# Patient Record
Sex: FEMALE | Race: WHITE | NOT HISPANIC OR LATINO | Employment: FULL TIME | URBAN - METROPOLITAN AREA
[De-identification: names, ages, dates, MRNs, and addresses within clinical notes are randomized per-mention and may not be internally consistent; named-entity substitution may affect disease eponyms.]

---

## 2019-01-09 PROBLEM — N92.6 IRREGULAR MENSES: Status: ACTIVE | Noted: 2019-01-09

## 2019-01-09 PROBLEM — R92.30 DENSE BREASTS: Status: ACTIVE | Noted: 2017-11-09

## 2021-08-09 PROBLEM — U07.1 COVID-19: Status: RESOLVED | Noted: 2021-01-06 | Resolved: 2021-08-09

## 2021-08-09 PROBLEM — Z83.719 FAMILY HISTORY OF COLONIC POLYPS: Status: ACTIVE | Noted: 2021-08-09

## 2022-07-12 PROBLEM — N95.1 PERIMENOPAUSAL VASOMOTOR SYMPTOMS: Status: ACTIVE | Noted: 2019-01-09

## 2022-08-19 PROBLEM — N95.1 MENOPAUSAL SYMPTOMS: Status: ACTIVE | Noted: 2022-08-19

## 2022-09-19 ENCOUNTER — HOSPITAL ENCOUNTER (OUTPATIENT)
Dept: MAMMOGRAPHY | Facility: MEDICAL CENTER | Age: 47
Discharge: HOME | End: 2022-09-19
Payer: COMMERCIAL

## 2022-09-19 VITALS — WEIGHT: 157.19 LBS | HEIGHT: 64 IN | BODY MASS INDEX: 26.84 KG/M2

## 2022-09-19 DIAGNOSIS — Z12.31 ENCOUNTER FOR SCREENING MAMMOGRAM FOR BREAST CANCER: ICD-10-CM

## 2022-09-19 PROCEDURE — 77067 SCR MAMMO BI INCL CAD: CPT

## 2022-09-19 PROCEDURE — 77063 BREAST TOMOSYNTHESIS BI: CPT

## 2023-08-22 ENCOUNTER — OFFICE VISIT (OUTPATIENT)
Dept: FAMILY MEDICINE CLINIC | Facility: CLINIC | Age: 48
End: 2023-08-22
Payer: COMMERCIAL

## 2023-08-22 ENCOUNTER — APPOINTMENT (OUTPATIENT)
Dept: LAB | Facility: CLINIC | Age: 48
End: 2023-08-22
Payer: COMMERCIAL

## 2023-08-22 VITALS
SYSTOLIC BLOOD PRESSURE: 122 MMHG | WEIGHT: 162.8 LBS | BODY MASS INDEX: 27.79 KG/M2 | HEIGHT: 64 IN | DIASTOLIC BLOOD PRESSURE: 78 MMHG

## 2023-08-22 DIAGNOSIS — Z13.220 SCREENING, LIPID: ICD-10-CM

## 2023-08-22 DIAGNOSIS — E66.3 OVERWEIGHT: ICD-10-CM

## 2023-08-22 DIAGNOSIS — Z00.00 ANNUAL PHYSICAL EXAM: Primary | ICD-10-CM

## 2023-08-22 DIAGNOSIS — Z13.1 SCREENING FOR DIABETES MELLITUS: ICD-10-CM

## 2023-08-22 DIAGNOSIS — Z12.31 ENCOUNTER FOR SCREENING MAMMOGRAM FOR BREAST CANCER: ICD-10-CM

## 2023-08-22 LAB
ALBUMIN SERPL BCP-MCNC: 3.7 G/DL (ref 3.5–5)
ALP SERPL-CCNC: 99 U/L (ref 46–116)
ALT SERPL W P-5'-P-CCNC: 36 U/L (ref 12–78)
ANION GAP SERPL CALCULATED.3IONS-SCNC: 3 MMOL/L
AST SERPL W P-5'-P-CCNC: 38 U/L (ref 5–45)
BILIRUB SERPL-MCNC: 0.47 MG/DL (ref 0.2–1)
BUN SERPL-MCNC: 16 MG/DL (ref 5–25)
CALCIUM SERPL-MCNC: 8.8 MG/DL (ref 8.3–10.1)
CHLORIDE SERPL-SCNC: 109 MMOL/L (ref 96–108)
CHOLEST SERPL-MCNC: 148 MG/DL
CO2 SERPL-SCNC: 27 MMOL/L (ref 21–32)
CREAT SERPL-MCNC: 0.75 MG/DL (ref 0.6–1.3)
EST. AVERAGE GLUCOSE BLD GHB EST-MCNC: 94 MG/DL
GFR SERPL CREATININE-BSD FRML MDRD: 95 ML/MIN/1.73SQ M
GLUCOSE P FAST SERPL-MCNC: 88 MG/DL (ref 65–99)
HBA1C MFR BLD: 4.9 %
HDLC SERPL-MCNC: 79 MG/DL
LDLC SERPL CALC-MCNC: 60 MG/DL (ref 0–100)
NONHDLC SERPL-MCNC: 69 MG/DL
POTASSIUM SERPL-SCNC: 3.9 MMOL/L (ref 3.5–5.3)
PROT SERPL-MCNC: 7 G/DL (ref 6.4–8.4)
SODIUM SERPL-SCNC: 139 MMOL/L (ref 135–147)
TRIGL SERPL-MCNC: 44 MG/DL

## 2023-08-22 PROCEDURE — 80053 COMPREHEN METABOLIC PANEL: CPT

## 2023-08-22 PROCEDURE — 36415 COLL VENOUS BLD VENIPUNCTURE: CPT

## 2023-08-22 PROCEDURE — 83036 HEMOGLOBIN GLYCOSYLATED A1C: CPT

## 2023-08-22 PROCEDURE — 80061 LIPID PANEL: CPT

## 2023-08-22 PROCEDURE — 99396 PREV VISIT EST AGE 40-64: CPT | Performed by: FAMILY MEDICINE

## 2023-08-22 RX ORDER — GRAPE SEED EXT/BIOFLAV,CITRUS 50MG-250MG
CAPSULE ORAL
COMMUNITY

## 2023-08-22 RX ORDER — ST. JOHN'S WORT 300 MG
CAPSULE ORAL
COMMUNITY
Start: 2023-08-21

## 2023-08-22 NOTE — ASSESSMENT & PLAN NOTE
Discussed diet and exercise with patient Patient has started walking again I did recommend flu and covid booster I have ordered labs Patient is up to date on screening tests Patient to see me in one year

## 2023-08-22 NOTE — PROGRESS NOTES
Chief Complaint   Patient presents with   • Annual Exam     Name: Kin Orozco      : 1975      MRN: [id number]  Encounter Provider: Abel Vasquez DO  Encounter Date: 2023   Encounter department: [facility] PRIMARY CARE    Assessment & Plan     1.  Encounter for screening mammogram for breast cancer           Subjective      HPI  Review of Systems    Current Outpatient Medications on File Prior to Visit   Medication Sig   • Black Cohosh 540 MG CAPS    • MULTIPLE VITAMINS PO Take by mouth daily   • Rhubarb (ESTROVEN COMPLETE PO)    • St Johns Wort 300 MG CAPS        Objective     /78   Ht 5' 4" (1.626 m)   Wt 73.8 kg (162 lb 12.8 oz)   BMI 27.94 kg/m²     Physical Exam  Abel Vasquez DO

## 2023-08-22 NOTE — PROGRESS NOTES
[address] [facility] PR[facility]    NAME: Megan Rebolledo  AGE: 53 y.o. SEX: female  : 1975     DATE: 2023     Assessment and Plan:     Problem List Items Addressed This Visit        Other    Annual physical exam - Primary     Discussed diet and exercise with patient Patient has started walking again I did recommend flu and covid booster I have ordered labs Patient is up to date on screening tests Patient to see me in one year         Overweight     Discussed diet and exercise         Other Visit Diagnoses     Encounter for screening mammogram for breast cancer        Relevant Orders    Mammo screening bilateral w 3d & cad    Screening for diabetes mellitus        Relevant Orders    Comprehensive metabolic panel    Hemoglobin A1C    Screening, lipid        Relevant Orders    Lipid panel          Immunizations and preventive care screenings were discussed with patient today. Appropriate education was printed on patient's after visit summary. Counseling:  Alcohol/drug use: discussed moderation in alcohol intake, the recommendations for healthy alcohol use, and avoidance of illicit drug use. Dental Health: discussed importance of regular tooth brushing, flossing, and dental visits. Injury prevention: discussed safety/seat belts, safety helmets, smoke detectors, carbon dioxide detectors, and smoking near bedding or upholstery. Sexual health: discussed sexually transmitted diseases, partner selection, use of condoms, avoidance of unintended pregnancy, and contraceptive alternatives. · Exercise: the importance of regular exercise/physical activity was discussed. Recommend exercise 3-5 times per week for at least 30 minutes. BMI Counseling: Body mass index is 27.94 kg/m². The BMI is above normal. Nutrition recommendations include decreasing portion sizes and moderation in carbohydrate intake.  Exercise recommendations include exercising 3-5 times per week. Rationale for BMI follow-up plan is due to patient being overweight or obese. Depression Screening and Follow-up Plan: Patient was screened for depression during today's encounter. They screened negative with a PHQ-2 score of 2. Return in 1 year (on 8/22/2024). Chief Complaint:     Chief Complaint   Patient presents with   • Annual Exam      History of Present Illness:     Adult Annual Physical   Patient here for a comprehensive physical exam. The patient reports problems - hot flashes and some concerns about weight . Diet and Physical Activity  · Diet/Nutrition: well balanced diet, limited junk food, low carb diet and consuming 3-5 servings of fruits/vegetables daily. · Exercise: walking, 5-7 times a week on average and 30-60 minutes on average. Depression Screening  PHQ-2/9 Depression Screening    Little interest or pleasure in doing things: 2 - more than half the days  Feeling down, depressed, or hopeless: 0 - not at all  PHQ-2 Score: 2  PHQ-2 Interpretation: Negative depression screen       General Health  · Sleep: 6.   · Hearing: normal - bilateral.  · Vision: most recent eye exam <1 year ago and wears glasses. · Dental: regular dental visits and brushes teeth twice daily. /GYN Health  · Patient is: perimenopausal  · Last menstrual period: november 2022  · Contraceptive method: post menopausal.     Review of Systems:     Review of Systems   Constitutional: Negative for fatigue, fever and unexpected weight change. HENT: Negative for congestion, sinus pain and trouble swallowing. Eyes: Negative for discharge and visual disturbance. Respiratory: Negative for cough, chest tightness, shortness of breath and wheezing. Cardiovascular: Negative for chest pain, palpitations and leg swelling. Gastrointestinal: Negative for abdominal pain, blood in stool, constipation, diarrhea, nausea and vomiting.    Genitourinary: Negative for difficulty urinating, dysuria, frequency and hematuria. Musculoskeletal: Negative for arthralgias, gait problem and joint swelling. Skin: Negative for rash and wound. Allergic/Immunologic: Negative for environmental allergies and food allergies. Neurological: Negative for dizziness, syncope, weakness, numbness and headaches. Hematological: Negative for adenopathy. Does not bruise/bleed easily. Psychiatric/Behavioral: Negative for confusion, decreased concentration and sleep disturbance. The patient is not nervous/anxious. Past Medical History:     History reviewed. No pertinent past medical history. Past Surgical History:     Past Surgical History:   Procedure Laterality Date   • CHEST WALL BIOPSY Right 10/27/2016    Procedure: ABDOMINAL MASS EXCISION ;  Surgeon: Savanna Zapata MD;  [facility];  Service:    • CHOLECYSTECTOMY      2011   • [place] BIOPSY SOFT TISSUE BACK/FLANK SUPERFICIAL Left 10/27/2016    Procedure: SHOULDER MASS EXCISION ;  Surgeon: Savanna Zapata MD;  [facility]: [facility];  Service: Plastics      Social History:     Social History     Socioeconomic History   • Marital status: /Civil Union     Spouse name: None   • Number of children: None   • Years of education: None   • Highest education level: None   Occupational History   • None   Tobacco Use   • Smoking status: Never   • Smokeless tobacco: Never   Vaping Use   • Vaping Use: Never used   Substance and Sexual Activity   • Alcohol use:  Yes     Alcohol/week: 1.0 standard drink of alcohol     Types: 1 Glasses of wine per week     Comment: occasionally 2 times per week   • Drug use: No   • Sexual activity: Yes     Partners: Male     Birth control/protection: Male Sterilization   Other Topics Concern   • None   Social History Narrative   • None     Social Determinants of Health     Financial Resource Strain: Not on file   Food Insecurity: Not on file   Transportation Needs: Not on file   Physical Activity: Not on file   Stress: Not on file   Social Connections: Not on file   Intimate Partner Violence: Not on file   Housing Stability: Not on file      Family History:     Family History   Problem Relation Age of Onset   • Hypertension Mother    • Colon polyps Mother    • Hypertension Father    • Diabetes Father    • Prostate cancer Father 62        2nd occurence 61   • No Known Problems Sister    • No Known Problems Son    • No Known Problems Son    • No Known Problems Maternal Grandmother    • Lymphoma Maternal Grandfather 80   • Cancer Paternal Grandmother    • Parkinsonism Paternal Grandmother    • Hypertension Paternal Grandmother    • No Known Problems Paternal Grandfather    • No Known Problems Maternal Uncle    • No Known Problems Paternal Aunt    • Endometrial cancer Other 80   • Breast cancer Other 80        age unknown   • Ovarian cancer Other 80   • Hypertension Other    • Kidney disease Other       Current Medications:     Current Outpatient Medications   Medication Sig Dispense Refill   • Black Cohosh 540 MG CAPS      • MULTIPLE VITAMINS PO Take by mouth daily     • Rhubarb (ESTROVEN COMPLETE PO)      • St Johns Wort 300 MG CAPS        No current facility-administered medications for this visit. Allergies:     No Known Allergies   Physical Exam:     /78   Ht 5' 4" (1.626 m)   Wt 73.8 kg (162 lb 12.8 oz)   BMI 27.94 kg/m²     Physical Exam  Vitals and nursing note reviewed. Constitutional:       Appearance: Normal appearance. She is well-developed. HENT:      Head: Normocephalic and atraumatic. Right Ear: Tympanic membrane and external ear normal.      Left Ear: Tympanic membrane and external ear normal.      Nose: Nose normal.      Mouth/Throat:      Pharynx: No oropharyngeal exudate. Eyes:      Extraocular Movements: Extraocular movements intact. Conjunctiva/sclera: Conjunctivae normal.      Pupils: Pupils are equal, round, and reactive to light. Neck:      Thyroid: No thyromegaly.       Trachea: No tracheal deviation. Cardiovascular:      Rate and Rhythm: Normal rate and regular rhythm. Heart sounds: Normal heart sounds. Pulmonary:      Effort: Pulmonary effort is normal. No respiratory distress. Breath sounds: Normal breath sounds. No wheezing or rales. Abdominal:      General: Bowel sounds are normal.      Palpations: Abdomen is soft. Musculoskeletal:         General: Normal range of motion. Cervical back: Normal range of motion and neck supple. Lymphadenopathy:      Cervical: No cervical adenopathy. Skin:     General: Skin is warm. Findings: No rash. Neurological:      General: No focal deficit present. Mental Status: She is alert and oriented to person, place, and time. Cranial Nerves: No cranial nerve deficit. Motor: No abnormal muscle tone. Psychiatric:         Mood and Affect: Mood normal.         Behavior: Behavior normal.         Thought Content:  Thought content normal.         Judgment: Judgment normal.          [facility]

## 2023-08-25 ENCOUNTER — ANNUAL EXAM (OUTPATIENT)
Dept: GYNECOLOGY | Facility: CLINIC | Age: 48
End: 2023-08-25
Payer: COMMERCIAL

## 2023-08-25 VITALS
SYSTOLIC BLOOD PRESSURE: 112 MMHG | DIASTOLIC BLOOD PRESSURE: 80 MMHG | WEIGHT: 162.6 LBS | HEIGHT: 64 IN | BODY MASS INDEX: 27.76 KG/M2

## 2023-08-25 DIAGNOSIS — N95.2 VAGINAL ATROPHY: ICD-10-CM

## 2023-08-25 DIAGNOSIS — Z12.31 ENCOUNTER FOR SCREENING MAMMOGRAM FOR BREAST CANCER: ICD-10-CM

## 2023-08-25 DIAGNOSIS — Z01.419 WOMEN'S ANNUAL ROUTINE GYNECOLOGICAL EXAMINATION: Primary | ICD-10-CM

## 2023-08-25 DIAGNOSIS — N95.1 MENOPAUSAL SYMPTOMS: ICD-10-CM

## 2023-08-25 PROCEDURE — S0612 ANNUAL GYNECOLOGICAL EXAMINA: HCPCS | Performed by: OBSTETRICS & GYNECOLOGY

## 2023-08-25 NOTE — PROGRESS NOTES
Assessment/Plan   Diagnoses and all orders for this visit:    Women's annual routine gynecological examination    Encounter for screening mammogram for breast cancer  -     Mammo screening bilateral w 3d & cad; Future    Vaginal atrophy    Menopausal symptoms    1. yearly exam-Pap smear deferred, self breast awareness reviewed, calcium/vitamin D recommendations discussed, mammogram request given, Cologuard was - 9/26/2022. Counseled about 92% sensitivity versus colonoscopy, would follow-up in 3 years time, September 2025  2. menopause symptoms-does note significant night sweats and some disruption with sleep. Practical recommendations were reviewed. She is trying black cohosh, Estroven with some response but still notes symptoms. Did discuss medical treatments including SSRI medication, hormones, and Veozah. She declines these at present. They were written down for her and she will consider researching them. She was given education sheets on menopause and she will review them. 3.  Prior increased breast density-normal density most recent mammograms. 4.  Perimenopausal-last menses November 2022.  5.  Vaginal atrophy-does note intermittent dryness in the vagina and some discomfort with sex occasionally. Vaginal furcation/moisturizer sheet given, to use accordingly. Discussed vaginal estrogen as well, she will consider. 6. previous history irregular bleeding-resolved  7. other- last year. My congratulations given. 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 My support given for this excellent program.  Follow-up 1 year for yearly exam or as needed. Subjective   Patient ID: Marion Patel is a 53 y.o. female. Vitals:    08/25/23 0654   BP: 112/80     Patient was seen today for yearly exam.  Please see assessment plan for details.       The following portions of the patient's history were reviewed and updated as appropriate: allergies, current medications, past family history, past medical history, past social history, past surgical history and problem list.  History reviewed. No pertinent past medical history. Past Surgical History:   Procedure Laterality Date   • CHEST WALL BIOPSY Right 10/27/2016    Procedure: ABDOMINAL MASS EXCISION ;  Surgeon: Savanna Zapata MD;  [facility];  Service:    • CHOLECYSTECTOMY         • [place] BIOPSY SOFT TISSUE BACK/FLANK SUPERFICIAL Left 10/27/2016    Procedure: SHOULDER MASS EXCISION ;  Surgeon: Savanna Zapata MD;  [facility];  Service: Plastics     OB History    Para Term  AB Living   2 2 2 0 0 2   SAB IAB Ectopic Multiple Live Births   0 0 0 0 0      # Outcome Date GA Lbr Sav/2nd Weight Sex Delivery Anes PTL Lv   2 Term            1 Term                Current Outpatient Medications:   •  Black Cohosh 540 MG CAPS, , Disp: , Rfl:   •  MULTIPLE VITAMINS PO, Take by mouth daily, Disp: , Rfl:   •  Rhubarb (ESTROVEN COMPLETE PO), , Disp: , Rfl:   •  St Johns Wort 300 MG CAPS, , Disp: , Rfl:   No Known Allergies  Social History     Socioeconomic History   • Marital status: /Civil Union     Spouse name: None   • Number of children: None   • Years of education: None   • Highest education level: None   Occupational History   • None   Tobacco Use   • Smoking status: Never   • Smokeless tobacco: Never   Vaping Use   • Vaping Use: Never used   Substance and Sexual Activity   • Alcohol use:  Yes     Alcohol/week: 1.0 standard drink of alcohol     Types: 1 Glasses of wine per week     Comment: occasionally 2 times per week   • Drug use: No   • Sexual activity: Yes     Partners: Male     Birth control/protection: Male Sterilization   Other Topics Concern   • None   Social History Narrative   • None     Social Determinants of Health     Financial Resource Strain: Not on file   Food Insecurity: Not on file   Transportation Needs: Not on file   Physical Activity: Not on file   Stress: Not on file   Social Connections: Not on file   Intimate Partner Violence: Not on file   Housing Stability: Not on file     Family History   Problem Relation Age of Onset   • Hypertension Mother    • Colon polyps Mother    • Hypertension Father    • Diabetes Father    • Prostate cancer Father 62        2nd occurence 61   • No Known Problems Sister    • No Known Problems Son    • No Known Problems Son    • No Known Problems Maternal Grandmother    • Lymphoma Maternal Grandfather 80   • Cancer Paternal Grandmother    • Parkinsonism Paternal Grandmother    • Hypertension Paternal Grandmother    • No Known Problems Paternal Grandfather    • No Known Problems Maternal Uncle    • No Known Problems Paternal Aunt    • Endometrial cancer Other 80   • Breast cancer Other 80        age unknown   • Ovarian cancer Other 80   • Hypertension Other    • Kidney disease Other        Review of Systems   Constitutional: Negative for chills, diaphoresis, fatigue and fever. Respiratory: Negative for apnea, cough, chest tightness, shortness of breath and wheezing. Cardiovascular: Negative for chest pain, palpitations and leg swelling. Gastrointestinal: Negative for abdominal distention, abdominal pain, anal bleeding, constipation, diarrhea, nausea, rectal pain and vomiting. Genitourinary: Negative for difficulty urinating, dyspareunia, dysuria, frequency, hematuria, menstrual problem, pelvic pain, urgency, vaginal bleeding, vaginal discharge and vaginal pain. Musculoskeletal: Negative for arthralgias, back pain and myalgias. Skin: Negative for color change and rash. Neurological: Negative for dizziness, syncope, light-headedness, numbness and headaches. Hematological: Negative for adenopathy. Does not bruise/bleed easily. Psychiatric/Behavioral: Negative for dysphoric mood and sleep disturbance. The patient is not nervous/anxious.         Objective   Physical Exam  OBGyn Exam     Objective      /80 (BP Location: Right arm, Patient Position: Sitting)   Ht 5' 4" (1.626 m)   Wt 73.8 kg (162 lb 9.6 oz)   BMI 27.91 kg/m²     General:   alert and oriented, in no acute distress   Neck: normal to inspection and palpation   Breast: normal appearance, no masses or tenderness   Heart:    Lungs:    Abdomen: soft, non-tender, without masses or organomegaly   Vulva: normal   Vagina: Without erythema or lesions or discharge. Mildly atrophic   Cervix: Without lesions or discharge or cervicitis. No Cervical motion tenderness.   Mildly atrophic   Uterus: top normal size, anteverted, non-tender   Adnexa: no mass, fullness, tenderness   Rectum: negative    Psych:  Normal mood and affect   Skin:  Without obvious lesions   Eyes: symmetric, with normal movements and reactivity   Musculoskeletal:  Normal muscle tone and movements appreciated

## 2023-12-04 ENCOUNTER — HOSPITAL ENCOUNTER (OUTPATIENT)
Dept: MAMMOGRAPHY | Facility: MEDICAL CENTER | Age: 48
Discharge: HOME | End: 2023-12-04
Payer: COMMERCIAL

## 2023-12-04 VITALS — WEIGHT: 162.7 LBS | BODY MASS INDEX: 27.78 KG/M2 | HEIGHT: 64 IN

## 2023-12-04 DIAGNOSIS — Z12.31 ENCOUNTER FOR SCREENING MAMMOGRAM FOR BREAST CANCER: ICD-10-CM

## 2023-12-04 PROCEDURE — 77067 SCR MAMMO BI INCL CAD: CPT

## 2023-12-04 PROCEDURE — 77063 BREAST TOMOSYNTHESIS BI: CPT

## 2024-08-01 ENCOUNTER — APPOINTMENT (OUTPATIENT)
Dept: LAB | Facility: CLINIC | Age: 49
End: 2024-08-01
Payer: COMMERCIAL

## 2024-08-01 DIAGNOSIS — Z00.8 HEALTH EXAMINATION IN POPULATION SURVEY: ICD-10-CM

## 2024-08-01 LAB
CHOLEST SERPL-MCNC: 141 MG/DL
EST. AVERAGE GLUCOSE BLD GHB EST-MCNC: 91 MG/DL
HBA1C MFR BLD: 4.8 %
HDLC SERPL-MCNC: 62 MG/DL
LDLC SERPL CALC-MCNC: 65 MG/DL (ref 0–100)
NONHDLC SERPL-MCNC: 79 MG/DL
TRIGL SERPL-MCNC: 72 MG/DL

## 2024-08-01 PROCEDURE — 83036 HEMOGLOBIN GLYCOSYLATED A1C: CPT

## 2024-08-01 PROCEDURE — 80061 LIPID PANEL: CPT

## 2024-08-01 PROCEDURE — 36415 COLL VENOUS BLD VENIPUNCTURE: CPT

## 2024-08-06 ENCOUNTER — OFFICE VISIT (OUTPATIENT)
Dept: FAMILY MEDICINE CLINIC | Facility: CLINIC | Age: 49
End: 2024-08-06
Payer: COMMERCIAL

## 2024-08-06 VITALS
BODY MASS INDEX: 28.48 KG/M2 | SYSTOLIC BLOOD PRESSURE: 118 MMHG | TEMPERATURE: 98.4 F | WEIGHT: 166.8 LBS | HEART RATE: 83 BPM | HEIGHT: 64 IN | DIASTOLIC BLOOD PRESSURE: 80 MMHG | OXYGEN SATURATION: 99 % | RESPIRATION RATE: 16 BRPM

## 2024-08-06 DIAGNOSIS — E66.3 OVERWEIGHT: ICD-10-CM

## 2024-08-06 DIAGNOSIS — Z00.00 ANNUAL PHYSICAL EXAM: Primary | ICD-10-CM

## 2024-08-06 DIAGNOSIS — N95.2 VAGINAL ATROPHY: ICD-10-CM

## 2024-08-06 DIAGNOSIS — N95.1 MENOPAUSAL SYMPTOMS: ICD-10-CM

## 2024-08-06 PROCEDURE — 99396 PREV VISIT EST AGE 40-64: CPT | Performed by: FAMILY MEDICINE

## 2024-08-06 NOTE — ASSESSMENT & PLAN NOTE
Discussed sunscreen and skin cancer prevention Advanced directives paperwork was given to patient Flu shot recommended Patient is up to date with GYN for pap and mammogram recommended mammogram yearly She is up to date with colon cancer screening discussed diet and exercise with patient   
Refer to menopausal services  
Sandi is stable discussed diet and exercise with her followup in one year   
WDL

## 2024-08-06 NOTE — PROGRESS NOTES
Adult Annual Physical  Name: Belen Lawton      : 1975      MRN: [id number]  Encounter Provider: Shirley Powell DO  Encounter Date: 2024   Encounter department: [facility] PRIMARY CARE    Assessment & Plan   1. Annual physical exam  Assessment & Plan:  Discussed sunscreen and skin cancer prevention Advanced directives paperwork was given to patient Flu shot recommended Patient is up to date with GYN for pap and mammogram recommended mammogram yearly She is up to date with colon cancer screening discussed diet and exercise with patient   2. Menopausal symptoms  Assessment & Plan:  Refer to menopausal services  Orders:  -     Ambulatory Referral to Obstetrics / Gynecology; Future  3. Vaginal atrophy  -     Ambulatory Referral to Obstetrics / Gynecology; Future  4. Overweight  Assessment & Plan:  Sandi is stable discussed diet and exercise with her followup in one year     Immunizations and preventive care screenings were discussed with patient today. Appropriate education was printed on patient's after visit summary.    Counseling:  Alcohol/drug use: discussed moderation in alcohol intake, the recommendations for healthy alcohol use, and avoidance of illicit drug use.  Dental Health: discussed importance of regular tooth brushing, flossing, and dental visits.  Injury prevention: discussed safety/seat belts, safety helmets, smoke detectors, carbon dioxide detectors, and smoking near bedding or upholstery.  Sexual health: discussed sexually transmitted diseases, partner selection, use of condoms, avoidance of unintended pregnancy, and contraceptive alternatives.  Exercise: the importance of regular exercise/physical activity was discussed. Recommend exercise 3-5 times per week for at least 30 minutes.          History of Present Illness     Adult Annual Physical:  Patient presents for annual physical. Patient is here annual PE She is watching her diet She is using premier shake breakfast  then has salad for lunch She does walk during the week Patient is not consistent with sunscreen use She is menopausal for almost 2 yrs She is struggling with sleep and hot flashes Patient is interested in GYN menopause department She has not done living will Work is going well Her lipids and A1c are normal She is up to date on her screenings and shots.     Diet and Physical Activity:  - Diet/Nutrition: well balanced diet.  - Exercise: 3-4 times a week on average, 30-60 minutes on average and walking.    Depression Screening:  - PHQ-2 Score: 1    General Health:  - Sleep: sleeps poorly. hot flashes keep waking her  - Hearing: normal hearing right ear and normal hearing left ear.  - Vision: wears glasses and contacts and most recent eye exam > 1 year ago.  - Dental: regular dental visits and brushes teeth twice daily.    /GYN Health:  - Follows with GYN: yes.   - Menopause: postmenopausal.   - Last menstrual cycle: 11/21/2022.   - Contraception: menopause.      Advanced Care Planning:  - Has an advanced directive?: no    - Has a durable medical POA?: no    - ACP document given to patient?: yes      Review of Systems   Constitutional:  Negative for fatigue, fever and unexpected weight change.   HENT:  Negative for congestion, sinus pain and trouble swallowing.    Eyes:  Negative for discharge and visual disturbance.   Respiratory:  Negative for cough, chest tightness, shortness of breath and wheezing.    Cardiovascular:  Negative for chest pain, palpitations and leg swelling.   Gastrointestinal:  Negative for abdominal pain, blood in stool, constipation, diarrhea, nausea and vomiting.   Genitourinary:  Negative for difficulty urinating, dysuria, frequency and hematuria.   Musculoskeletal:  Negative for arthralgias, gait problem and joint swelling.   Skin:  Negative for rash and wound.   Allergic/Immunologic: Negative for environmental allergies and food allergies.   Neurological:  Negative for dizziness, syncope,  "weakness, numbness and headaches.   Hematological:  Negative for adenopathy. Does not bruise/bleed easily.   Psychiatric/Behavioral:  Positive for sleep disturbance. Negative for confusion and decreased concentration. The patient is not nervous/anxious.          Objective     /80   Pulse 83   Temp 98.4 °F (36.9 °C) (Temporal)   Resp 16   Ht 5' 4\" (1.626 m)   Wt 75.7 kg (166 lb 12.8 oz)   LMP 11/19/2021   SpO2 99%   BMI 28.63 kg/m²     Physical Exam  Vitals and nursing note reviewed.   Constitutional:       Appearance: Normal appearance. She is well-developed.   HENT:      Head: Normocephalic and atraumatic.      Right Ear: Hearing, tympanic membrane and external ear normal.      Left Ear: Hearing, tympanic membrane and external ear normal.   Eyes:      Extraocular Movements: Extraocular movements intact.      Conjunctiva/sclera: Conjunctivae normal.      Pupils: Pupils are equal, round, and reactive to light.   Neck:      Thyroid: No thyromegaly.   Cardiovascular:      Rate and Rhythm: Normal rate and regular rhythm.      Heart sounds: Normal heart sounds.   Pulmonary:      Effort: Pulmonary effort is normal.      Breath sounds: Normal breath sounds. No wheezing or rales.   Abdominal:      General: Bowel sounds are normal. There is no distension.      Palpations: Abdomen is soft.      Tenderness: There is no abdominal tenderness.   Musculoskeletal:         General: No tenderness.      Cervical back: Neck supple.   Lymphadenopathy:      Cervical: No cervical adenopathy.   Skin:     General: Skin is warm and dry.      Findings: No rash.   Neurological:      General: No focal deficit present.      Mental Status: She is alert and oriented to person, place, and time.      Cranial Nerves: No cranial nerve deficit.      Coordination: Coordination normal.   Psychiatric:         Mood and Affect: Mood normal.         Behavior: Behavior normal.         Thought Content: Thought content normal.         Judgment: " Judgment normal.

## 2024-08-06 NOTE — PATIENT INSTRUCTIONS
"Patient Education     Routine physical for adults   The Basics   Written by the doctors and editors at [facility]   What is a physical? -- A physical is a routine visit, or \"check-up,\" with your doctor. You might also hear it called a \"wellness visit\" or \"preventive visit.\"  During each visit, the doctor will:   Ask about your physical and mental health   Ask about your habits, behaviors, and lifestyle   Do an exam   Give you vaccines if needed   Talk to you about any medicines you take   Give advice about your health   Answer your questions  Getting regular check-ups is an important part of taking care of your health. It can help your doctor find and treat any problems you have. But it's also important for preventing health problems.  A routine physical is different from a \"sick visit.\" A sick visit is when you see a doctor because of a health concern or problem. Since physicals are scheduled ahead of time, you can think about what you want to ask the doctor.  How often should I get a physical? -- It depends on your age and health. In general, for people age 21 years and older:   If you are younger than 50 years, you might be able to get a physical every 3 years.   If you are 50 years or older, your doctor might recommend a physical every year.  If you have an ongoing health condition, like diabetes or high blood pressure, your doctor will probably want to see you more often.  What happens during a physical? -- In general, each visit will include:   Physical exam - The doctor or nurse will check your height, weight, heart rate, and blood pressure. They will also look at your eyes and ears. They will ask about how you are feeling and whether you have any symptoms that bother you.   Medicines - It's a good idea to bring a list of all the medicines you take to each doctor visit. Your doctor will talk to you about your medicines and answer any questions. Tell them if you are having any side effects that bother you. You " "should also tell them if you are having trouble paying for any of your medicines.   Habits and behaviors - This includes:   Your diet   Your exercise habits   Whether you smoke, drink alcohol, or use drugs   Whether you are sexually active   Whether you feel safe at home  Your doctor will talk to you about things you can do to improve your health and lower your risk of health problems. They will also offer help and support. For example, if you want to quit smoking, they can give you advice and might prescribe medicines. If you want to improve your diet or get more physical activity, they can help you with this, too.   Lab tests, if needed - The tests you get will depend on your age and situation. For example, your doctor might want to check your:   Cholesterol   Blood sugar   Iron level   Vaccines - The recommended vaccines will depend on your age, health, and what vaccines you already had. Vaccines are very important because they can prevent certain serious or deadly infections.   Discussion of screening - \"Screening\" means checking for diseases or other health problems before they cause symptoms. Your doctor can recommend screening based on your age, risk, and preferences. This might include tests to check for:   Cancer, such as breast, prostate, cervical, ovarian, colorectal, prostate, lung, or skin cancer   Sexually transmitted infections, such as chlamydia and gonorrhea   Mental health conditions like depression and anxiety  Your doctor will talk to you about the different types of screening tests. They can help you decide which screenings to have. They can also explain what the results might mean.   Answering questions - The physical is a good time to ask the doctor or nurse questions about your health. If needed, they can refer you to other doctors or specialists, too.  Adults older than 65 years often need other care, too. As you get older, your doctor will talk to you about:   How to prevent falling at " home   Hearing or vision tests   Memory testing   How to take your medicines safely   Making sure that you have the help and support you need at home  All topics are updated as new evidence becomes available and our peer review process is complete.  This topic retrieved from [employer] on: May 02, 2024.  Topic 402963 Version 1.0  Release: 32.4.3 - C32.122  © 2024 UpToDate, Inc. and/or its affiliates. All rights reserved.  Consumer Information Use and Disclaimer   Disclaimer: This generalized information is a limited summary of diagnosis, treatment, and/or medication information. It is not meant to be comprehensive and should be used as a tool to help the user understand and/or assess potential diagnostic and treatment options. It does NOT include all information about conditions, treatments, medications, side effects, or risks that may apply to a specific patient. It is not intended to be medical advice or a substitute for the medical advice, diagnosis, or treatment of a health care provider based on the health care provider's examination and assessment of a patient's specific and unique circumstances. Patients must speak with a health care provider for complete information about their health, medical questions, and treatment options, including any risks or benefits regarding use of medications. This information does not endorse any treatments or medications as safe, effective, or approved for treating a specific patient. UpToDate, Inc. and its affiliates disclaim any warranty or liability relating to this information or the use thereof.The use of this information is governed by the Terms of Use, available at https://www.[employer].com/en/know/clinical-effectiveness-terms. 2024© UpToDate, Inc. and its affiliates and/or licensors. All rights reserved.  Copyright   © 2024 UpToDate, Inc. and/or its affiliates. All rights reserved.

## 2024-10-03 ENCOUNTER — OFFICE VISIT (OUTPATIENT)
Age: 49
End: 2024-10-03
Payer: COMMERCIAL

## 2024-10-03 VITALS
TEMPERATURE: 97.2 F | WEIGHT: 167.99 LBS | RESPIRATION RATE: 17 BRPM | OXYGEN SATURATION: 98 % | SYSTOLIC BLOOD PRESSURE: 114 MMHG | HEART RATE: 75 BPM | HEIGHT: 64 IN | BODY MASS INDEX: 28.68 KG/M2 | DIASTOLIC BLOOD PRESSURE: 70 MMHG

## 2024-10-03 DIAGNOSIS — M79.602 LEFT ARM PAIN: Primary | ICD-10-CM

## 2024-10-03 PROCEDURE — 99203 OFFICE O/P NEW LOW 30 MIN: CPT

## 2024-10-03 NOTE — PROGRESS NOTES
[facility]        NAME: Belen Lawton is a 49 y.o. female  : 1975    MRN: [id number]  DATE: October 3, 2024  TIME: 1:41 PM    Assessment and Plan   Left arm pain [M79.602]  1. Left arm pain        Recommended patient to follow up with PCP. Ibuprofen and ice in the meantime. Discussed that there may possibly be a circulation issue to the finger, and she should be further evaluated and worked up. Offered X-ray, patient declines as there was no trauma to the finger. Patient agreeable to plan. ED if symptoms worsen.   Patient Instructions     Follow up with PCP in 3-5 days.  Proceed to ER if symptoms worsen.    If tests have been performed at [facility], our office will contact you with results if changes need to be made to the care plan discussed with you at the visit.  You can review your full results on [facility] MyChart.    Chief Complaint     Chief Complaint   Patient presents with    Hand Pain     Pain and numbness in LEFT hand and forearm. Noticed bruising on LEFT hand when waking up this morning. States that pain has worsened throughout the day. Now radiating into elbow and LEFT shoulder. Denies injury to that area or changes in activity. OTC - none     History of Present Illness     Patient is a 49-year-old female presenting complaining of left index finger pain that started this morning. She notes there was bruising over the DIP that has now resolved, and the pain radiates up her lateral arm and back. She states she has had no injury to the finger, and the discoloration has dissipated now. She states this happened one other time before, but it resolved and did not cause any nerve pain. She was unsure what could be causing this.     Hand Pain         Review of Systems   Review of Systems   Constitutional:  Negative for chills and fever.   HENT: Negative.     Respiratory: Negative.     Gastrointestinal: Negative.    Genitourinary: Negative.    Musculoskeletal:  Positive for arthralgias and  myalgias. Negative for joint swelling.   Skin:  Positive for color change.   Neurological: Negative.          Current Medications       Current Outpatient Medications:     MULTIPLE VITAMINS PO, Take by mouth daily, Disp: , Rfl:     Rhubarb (ESTROVEN COMPLETE PO), , Disp: , Rfl:     St Johns Wort 300 MG CAPS, , Disp: , Rfl:     Black Cohosh 540 MG CAPS, , Disp: , Rfl:     Current Allergies     Allergies as of 10/03/2024    (No Known Allergies)            The following portions of the patient's history were reviewed and updated as appropriate: allergies, current medications, past family history, past medical history, past social history, past surgical history and problem list.     History reviewed. No pertinent past medical history.    Past Surgical History:   Procedure Laterality Date    CHEST WALL BIOPSY Right 10/27/2016    Procedure: ABDOMINAL MASS EXCISION ;  Surgeon: Darlin Reyna MD;  Location: [facility];  Service:     CHOLECYSTECTOMY      2011    ID BIOPSY SOFT TISSUE BACK/FLANK SUPERFICIAL Left 10/27/2016    Procedure: SHOULDER MASS EXCISION ;  Surgeon: Darlin Reyna MD;  Location: [facility];  Service: Plastics       Family History   Problem Relation Age of Onset    Hypertension Mother     Colon polyps Mother     Hypertension Father     Diabetes Father     Prostate cancer Father 58        2nd occurence 63    Leukemia Father     No Known Problems Sister     No Known Problems Son     Crohn's disease Son     No Known Problems Maternal Grandmother     Prostate cancer Maternal Grandfather     Lymphoma Maternal Grandfather 80    Cancer Paternal Grandmother     Parkinsonism Paternal Grandmother     Hypertension Paternal Grandmother     No Known Problems Paternal Grandfather     Ovarian cancer Maternal Aunt     Breast cancer Maternal Aunt     Breast cancer Maternal Aunt     No Known Problems Maternal Uncle     No Known Problems Paternal Aunt     Endometrial cancer Other 80    Breast cancer Other 80        age unknown     "Ovarian cancer Other 80    Hypertension Other     Kidney disease Other          Medications have been verified.        Objective   /70 (BP Location: Right arm, Patient Position: Sitting, Cuff Size: Standard)   Pulse 75   Temp (!) 97.2 °F (36.2 °C) (Tympanic)   Resp 17   Ht 5' 4\" (1.626 m)   Wt 76.2 kg (167 lb 15.9 oz)   LMP 11/19/2021   SpO2 98%   BMI 28.84 kg/m²   Patient's last menstrual period was 11/19/2021.       Physical Exam     Physical Exam  Vitals and nursing note reviewed.   Constitutional:       General: She is not in acute distress.     Appearance: Normal appearance. She is normal weight. She is not ill-appearing, toxic-appearing or diaphoretic.   HENT:      Head: Normocephalic and atraumatic.      Right Ear: External ear normal.      Left Ear: External ear normal.      Nose: Nose normal.      Mouth/Throat:      Mouth: Mucous membranes are moist.   Eyes:      Conjunctiva/sclera: Conjunctivae normal.   Cardiovascular:      Rate and Rhythm: Normal rate.      Pulses: Normal pulses.   Pulmonary:      Effort: Pulmonary effort is normal.      Breath sounds: Normal breath sounds.   Musculoskeletal:         General: Tenderness (over left index DIP, no tenderness to palpation of back, shoulder, or forearm) present. No swelling, deformity or signs of injury. Normal range of motion.      Cervical back: Normal range of motion.   Skin:     General: Skin is warm and dry.      Capillary Refill: Capillary refill takes less than 2 seconds.   Neurological:      General: No focal deficit present.      Mental Status: She is alert. Mental status is at baseline.   Psychiatric:         Mood and Affect: Mood normal.         Behavior: Behavior normal.                   "

## 2024-12-11 ENCOUNTER — OFFICE VISIT (OUTPATIENT)
Dept: URGENT CARE | Facility: MEDICAL CENTER | Age: 49
End: 2024-12-11
Payer: COMMERCIAL

## 2024-12-11 VITALS
TEMPERATURE: 99.1 F | HEIGHT: 64 IN | WEIGHT: 174.2 LBS | SYSTOLIC BLOOD PRESSURE: 145 MMHG | RESPIRATION RATE: 18 BRPM | HEART RATE: 77 BPM | BODY MASS INDEX: 29.74 KG/M2 | DIASTOLIC BLOOD PRESSURE: 74 MMHG | OXYGEN SATURATION: 99 %

## 2024-12-11 DIAGNOSIS — M77.12 LATERAL EPICONDYLITIS OF LEFT ELBOW: Primary | ICD-10-CM

## 2024-12-11 PROCEDURE — 99213 OFFICE O/P EST LOW 20 MIN: CPT | Performed by: NURSE PRACTITIONER

## 2024-12-11 RX ORDER — METHYLPREDNISOLONE 4 MG/1
TABLET ORAL
Qty: 1 EACH | Refills: 0 | Status: SHIPPED | OUTPATIENT
Start: 2024-12-11

## 2024-12-11 NOTE — PROGRESS NOTES
[facility]        NAME: Belen Lawton is a 49 y.o. female  : 1975    MRN: [id number]  DATE: 2024  TIME: 3:41 PM    Assessment and Plan   Lateral epicondylitis of left elbow [M77.12]  1. Lateral epicondylitis of left elbow  methylPREDNISolone 4 MG tablet therapy pack    Ambulatory Referral to Orthopedic Surgery        Will start course of medrol dose pack   Ref to ortho   Recommend continue lidoderm patch   No aleve or advil while taking steroids   Pt in agreement with plan of care    Patient Instructions     Follow up with PCP in 3-5 days.  Proceed to  ER if symptoms worsen.    Chief Complaint     Chief Complaint   Patient presents with    Arm Pain     Left arm pain started about a week ago. Felt elbow pop when trying to close an umbrella. The pain now feels like it is shooting down from elbow to fingers. Pt states that it feels like there is something in her elbow.         History of Present Illness   Belen Lawton presents to the clinic c/o    Arm Pain (Left arm pain started about a week ago. Felt elbow pop when trying to close an umbrella. The pain now feels like it is shooting down from elbow to fingers. Pt states that it feels like there is something in her elbow.)    Pt states for the past week her left elbow was bothering her - taking advil and using lidoderm patches which seemed to help.  Today was trying to put down an umbrella that wasn't working well and felt a pop in her left elbow and a pain sensation into her left hand.           Review of Systems   Review of Systems   All other systems reviewed and are negative.        Current Medications     No long-term medications on file.       Current Allergies     Allergies as of 2024    (No Known Allergies)            The following portions of the patient's history were reviewed and updated as appropriate: allergies, current medications, past family history, past medical history, past social history, past surgical history and  "problem list.    Objective   /74   Pulse 77   Temp 99.1 °F (37.3 °C) (Tympanic)   Resp 18   Ht 5' 4\" (1.626 m)   Wt 79 kg (174 lb 3.2 oz)   LMP 11/19/2021   SpO2 99%   BMI 29.90 kg/m²        Physical Exam     Physical Exam  Vitals and nursing note reviewed.   Constitutional:       Appearance: Normal appearance. She is well-developed.   HENT:      Head: Normocephalic and atraumatic.      Right Ear: Hearing normal.      Left Ear: Hearing normal.      Nose: Nose normal.      Mouth/Throat:      Lips: Pink.      Mouth: Mucous membranes are moist.      Pharynx: Oropharynx is clear.   Eyes:      General: Lids are normal.      Conjunctiva/sclera: Conjunctivae normal.      Pupils: Pupils are equal, round, and reactive to light.   Cardiovascular:      Rate and Rhythm: Normal rate and regular rhythm.      Heart sounds: Normal heart sounds, S1 normal and S2 normal.   Pulmonary:      Effort: Pulmonary effort is normal.      Breath sounds: Normal breath sounds.   Abdominal:      General: Abdomen is flat. Bowel sounds are normal.      Palpations: Abdomen is soft.   Musculoskeletal:         General: Normal range of motion.      Left elbow: No swelling, deformity, effusion or lacerations. Normal range of motion. Tenderness present in lateral epicondyle. No radial head, medial epicondyle or olecranon process tenderness.      Cervical back: Full passive range of motion without pain, normal range of motion and neck supple.   Skin:     General: Skin is warm and dry.   Neurological:      General: No focal deficit present.      Mental Status: She is alert and oriented to person, place, and time.   Psychiatric:         Mood and Affect: Mood normal.         Speech: Speech normal.         Behavior: Behavior normal. Behavior is cooperative.         Thought Content: Thought content normal.         Judgment: Judgment normal.                     "

## 2024-12-11 NOTE — PATIENT INSTRUCTIONS
Patient Education     Lateral Epicondylitis Exercises   About this topic   The elbow is where your upper arm bone meets the two lower bones in your arm. There is a bump on the outside of your elbow at the bottom of your upper arm bone. It is the lateral epicondyle. A few tendons attach here. Tendons attach muscles to bone. These muscles are used to pull your wrist and fingers up.   When these tendons get sore and swollen from overuse, you have lateral epicondylitis. Is also called tennis elbow. This is a common problem in tennis players. It may also happen with other activities or sports. You are more likely to have this problem in the arm you use most, but it can happen in either arm. Exercises can help to make this problem better.  General   Before starting with a program, ask your doctor if you are healthy enough to do these exercises. Your doctor may have you work with a  or physical therapist to make a safe exercise program to meet your needs.  Stretching Exercises   Stretching exercises keep your muscles flexible. They also stop them from getting tight. Start by doing each of these stretches 2 to 3 times. In order for your body to make changes, you will need to hold these stretches for 20 to 30 seconds. Try to do the stretches 2 to 3 times each day. Do all exercises slowly.  Wrist stretches bending down ? Straighten your elbow and have your palm facing down. Keeping your sore elbow straight, bend your hand and fingers down so that your fingers are now pointing to the floor. Grab your hand with your other hand and push back the wrist further until you feel a stretch.  Strengthening Exercises   Strengthening exercises keep your muscles firm and strong. Start by repeating each exercise 2 to 3 times. Work up to doing each exercise 10 times. Try to do the exercises 2 to 3 times each day. Do all exercises slowly.  Some exercises can be done holding a small weight. You can use a can of soup or a hand weight.  If the exercise gets too easy, use heavier weights or do the exercise more times.  Wrist exercises seated with your lower arm supported on a table or your leg. Your hand should be off the edge:  Bending up ? Have your palm facing UP with a small weight in your hand. Bend your wrist up as far as you can. Now, lower the weight back down. Be sure to control the weight as you lower it. Repeat using other hand.  Bending down ? Have your palm facing DOWN with a small weight in your hand. Bend your wrist back as far as you can. Now, lower the weight back down. Be sure to control the weight as you lower it. Repeat using other hand.  Side-to-side ? Position your hand SIDEWAYS with your thumb up. With a weight in your hand, lift your hand straight up. Now, lower your hand back down. Repeat using other hand.  Lower arm twists with weight ? Start by having your elbow bent with your arm at your side. Hold a small weight in your hand. Keeping your arm at your side and not moving your elbow, turn the lower arm until your palm is facing down. Now, turn the lower arm until your palm is facing up. Repeat using other hand.  Finger spreads with rubber band ? Find a thick rubber band. Straighten your fingers and bring them together so they are touching each other. Place the rubber band around your fingers and thumb as close to the nails as possible. Spread your fingers out as far as you can. Then, slowly bring them back to the starting position.  Ball squeezes ? Gently squeeze a tennis ball 10 times. If you have no pain, squeeze with more pressure.  Tennis strokes with exercise band ? Once your pain has lessened and you are almost ready to return to the tennis court, try these 3 exercises. You will need to exercise near a door or closet that can be opened and closed easily. Start with a thinner band and work your way up to a thicker band. These band exercises can help you with three tennis strokes:  Fuentes ? Secure an exercise band in  a door at waist level. Stand sideways with the hand you use the closest to the door. Grab the band with that hand. Pull the band across your body like you do in a denver motion.  Backhand ? Secure an exercise band in a door at waist level. Stand sideways with the hand you use the most away from the door. Reach toward the door and grab the band with this hand. Now, pull the band across your body like you do in a backhand motion.  Serve ? Secure an exercise band in a door at shoulder level. Stand facing away from the door. Grab the band with the hand you use the most. Start with your hand up and behind your head like you would for the start of a serve. Pull the band up and over like you are doing a serving motion.  Your doctor or therapist may also have you use a rubber bar or Flex bar for exercises to help your lateral epicondylitis.               What will the results be?   Less pain and swelling  Increased strength  Better range of motion  Greater ease doing arm activities  Helpful tips   Stay active and work out to keep your muscles strong and flexible.  Keep a healthy weight to avoid putting too much stress on your joints. Eat a healthy diet to keep your muscles healthy.  Be sure you do not hold your breath when exercising. This can raise your blood pressure. If you tend to hold your breath, try counting out loud when exercising. If any exercise bothers you, stop right away.  Always warm up before stretching. Heated muscles stretch much easier than cool muscles. Stretching cool muscles can lead to injury.  Try walking and swinging your arms at an easy pace for a few minutes to warm up your muscles. Do this again after exercising.  Never bounce when doing stretches.  Doing exercises before a meal may be a good way to get into a routine.  If you are using weights, choose a weight that will allow you to repeat the exercise 10 times before resting. If you easily do 10 repeats, you may not be using enough weight. If  you are not able to do 10 repeats, you are using too heavy of a weight.  Exercise may be slightly uncomfortable, but you should not have sharp pains. If you do get sharp pains, stop what you are doing. If the sharp pains continue, call your doctor.  Last Reviewed Date   2021-08-03  Consumer Information Use and Disclaimer   This generalized information is a limited summary of diagnosis, treatment, and/or medication information. It is not meant to be comprehensive and should be used as a tool to help the user understand and/or assess potential diagnostic and treatment options. It does NOT include all information about conditions, treatments, medications, side effects, or risks that may apply to a specific patient. It is not intended to be medical advice or a substitute for the medical advice, diagnosis, or treatment of a health care provider based on the health care provider's examination and assessment of a patient’s specific and unique circumstances. Patients must speak with a health care provider for complete information about their health, medical questions, and treatment options, including any risks or benefits regarding use of medications. This information does not endorse any treatments or medications as safe, effective, or approved for treating a specific patient. [employer] and its affiliates disclaim any warranty or liability relating to this information or the use thereof. The use of this information is governed by the Terms of Use, available at https://www.tastytrade.com/en/know/clinical-effectiveness-terms   Copyright   Copyright © 2024 [employer] and its affiliates and/or licensors. All rights reserved.  Patient Education     Lateral Epicondylitis Discharge Instructions   About this topic   The elbow is where the bone in your upper arm meets the two bones in your lower arm. There is a bump on the outside of your elbow at the bottom of your upper arm bone. It is the lateral epicondyle. A few  tendons attach here. Tendons attach muscles to bone. These muscles are used to pull your wrist and fingers up.   When these tendons get sore and swollen from overuse, you have lateral epicondylitis. It is also called tennis elbow. This is a common problem in tennis players. It may also happen with other activities or sports. You are more likely to have this problem in the arm you use most. It can happen in either arm. Most people get better with treatment and do not need surgery.     What care is needed at home?   Ask your doctor what you need to do when you go home. Make sure you ask questions if you do not understand what the doctor says. This way you will know what you need to do.  Rest. Allow your injury to heal before you do slow movements.  Place an ice pack or a bag of frozen peas wrapped in a towel over the painful part. Never put ice right on the skin. Do not leave the ice on more than 10 to 15 minutes at a time. Ice after activity may help decrease pain and swelling. Never ice before stretching.  Prop your elbow on pillows to help with swelling.  Use a brace or strap around the elbow to lessen the stress on the tendon.  Heat may be used later but not right away. Heat can make swelling worse. If your doctor tells you to use heat, put a heating pad on the painful part for no more than 20 minutes at a time. Never go to sleep with a heating pad on as this can cause burns.  What follow-up care is needed?   Your doctor may ask you to make visits to the office to check on your progress. Be sure to keep these visits.   Your doctor may send you to a specialist called an orthopedic surgeon.  Your doctor may send you to physical therapy or occupational therapy for treatments and exercises to help you heal faster.  What drugs may be needed?   The doctor may order drugs to:  Help with pain and swelling  The doctor may give you a shot of an anti-inflammatory drug called a corticosteroid. This will help with swelling.  Talk with your doctor about the risks of this shot.  Will physical activity be limited?   You may need to rest your elbow for a while. You should not do physical activity that makes your health problem worse. If you work out or play sports, you may not be able to do those things until your health problem gets better.  What problems could happen?   Long-term elbow pain  Injury returns  Weak and tight muscles  What can be done to prevent this health problem?   Stay active and work out to keep your muscles strong and flexible.  Warm up slowly and stretch before you exercise. Use good training techniques and form for sports. Have an expert look at your technique.  When working out with weights, try to keep your elbow slightly bent instead of straightened all the way. Avoid lifting objects when your elbow is fully straightened or your palm is down.  Take breaks often when doing things that use repeat movements.  Take extra care when playing racquet sports. Ask an expert for help when choosing equipment.  Use a two-handed backhand instead of a one-handed backhand. This puts less stress on your arm.  Racquets with smaller head sizes, a stiffer frame, and looser strings put less stress on your arm.  Keep a healthy weight so there is not extra stress on your joints. Eat a healthy diet to keep your muscles healthy.  When do I need to call the doctor?   You have numbness or tingling in your hands or fingers  You are not feeling better in 2 or 3 days or you are feeling worse  Teach Back: Helping You Understand   The Teach Back Method helps you understand the information we are giving you. After you talk with the staff, tell them in your own words what you learned. This helps to make sure the staff has described each thing clearly. It also helps to explain things that may have been confusing. Before going home, make sure you can do these:  I can tell you about my condition.  I can tell you what may help ease my pain.  I can tell  you what I will do if I have numbness in my hands or fingers.  Last Reviewed Date   2020-08-26  Consumer Information Use and Disclaimer   This generalized information is a limited summary of diagnosis, treatment, and/or medication information. It is not meant to be comprehensive and should be used as a tool to help the user understand and/or assess potential diagnostic and treatment options. It does NOT include all information about conditions, treatments, medications, side effects, or risks that may apply to a specific patient. It is not intended to be medical advice or a substitute for the medical advice, diagnosis, or treatment of a health care provider based on the health care provider's examination and assessment of a patient’s specific and unique circumstances. Patients must speak with a health care provider for complete information about their health, medical questions, and treatment options, including any risks or benefits regarding use of medications. This information does not endorse any treatments or medications as safe, effective, or approved for treating a specific patient. Up[employer], [employer] and its affiliates disclaim any warranty or liability relating to this information or the use thereof. The use of this information is governed by the Terms of Use, available at https://www.wolters360Tuwer.com/en/know/clinical-effectiveness-terms   Copyright   Copyright © 2024 UpTo[employer], Inc. and its affiliates and/or licensors. All rights reserved.

## 2025-01-20 ENCOUNTER — OFFICE VISIT (OUTPATIENT)
Dept: OBGYN CLINIC | Facility: MEDICAL CENTER | Age: 50
End: 2025-01-20
Payer: COMMERCIAL

## 2025-01-20 ENCOUNTER — APPOINTMENT (OUTPATIENT)
Dept: RADIOLOGY | Facility: MEDICAL CENTER | Age: 50
End: 2025-01-20
Payer: COMMERCIAL

## 2025-01-20 VITALS — BODY MASS INDEX: 29.71 KG/M2 | HEIGHT: 64 IN | WEIGHT: 174 LBS

## 2025-01-20 DIAGNOSIS — M25.522 PAIN IN LEFT ELBOW: ICD-10-CM

## 2025-01-20 DIAGNOSIS — M25.522 PAIN IN LEFT ELBOW: Primary | ICD-10-CM

## 2025-01-20 DIAGNOSIS — M77.12 LATERAL EPICONDYLITIS OF LEFT ELBOW: ICD-10-CM

## 2025-01-20 PROCEDURE — 99203 OFFICE O/P NEW LOW 30 MIN: CPT | Performed by: ORTHOPAEDIC SURGERY

## 2025-01-20 PROCEDURE — 73080 X-RAY EXAM OF ELBOW: CPT

## 2025-01-20 NOTE — PROGRESS NOTES
Orthopaedic Surgery - Office Note  Belen Lawton (49 y.o. female)   : 1975   MRN: [id number]  Encounter Date: 2025    Assessment / Plan  Lateral epicondylitis  of the left elbow    Activity as tolerated  Anti-inflammatories or Tylenol prn pain  X-rays ordered and reviewed at today's visit  Discussed beginning stretching and strengthening exercises for lateral epicondylitis. Directions of which exercises to perform provided.   Referral for outpatient physical therapy provided  Recommended she begin wearing a wrist brace in the evenings and while sleeping  Discussed possible radial tunnel syndrome if pain and weakness persists after failing physical therapy, rest, and bracing. Allow 6-8 weeks to see relief in symptoms   Follow-up:  Return if symptoms worsen or fail to improve.      Chief Complaint / Date of Onset  Left elbow pain and burning   Injury Mechanism / Date  Felt a pop while closing an umbrella in December  Surgery / Date  None    History of Present Illness   Belen Lawton is a 49 y.o. female who presents for initial consultation of left elbow pain, referred to me by urgent care. She notes trying to close an umbrella in early December when she felt a pop in her left elbow. Since the pop, she notes increased pain in the left elbow with lifting weighted items and while sleeping at night. The pain is currently sharp in nature and she experiences burning with activity that radiates to the forearm and wrist. Pain is managed with rest, Advil, wearing a counter force strap, and stretching. She denies distal numbness and tingling.     Treatment Summary  Medications / Modalities  Lidoderm patches, Advil  Bracing / Immobilization  Counter force strap  Physical Therapy  None  Injections  None  Prior Surgeries  None  Other Treatments  None    Employment / Current Status  [employer] nurse in hospice care    Sport / Organization / Current Status  Active       Review of Systems  Pertinent items are noted in HPI.  All  "other systems were reviewed and are negative.      Physical Exam  Ht 5' 4\" (1.626 m)   Wt 78.9 kg (174 lb)   LMP 11/19/2021   BMI 29.87 kg/m²   Cons: Appears well.  No apparent distress.  Psych: Alert. Oriented x3.  Mood and affect normal.  Eyes: PERRLA, EOMI  Resp: Normal effort.  No audible wheezing or stridor.  CV: Palpable pulse.  No discernable arrhythmia.    Lymph:  No palpable cervical, axillary, or inguinal lymphadenopathy.  Skin: Warm.  No palpable masses.  No visible lesions.  Neuro: Normal muscle tone.  Normal and symmetric DTR's.     Left Elbow Exam  Alignment:  Normal elbow alignment and carrying angle. Normal resting hand posture.  Inspection:  No swelling. No ecchymosis. No deformity.  Palpation:   Moderate tenderness at lateral extensor wad.  ROM:  Elbow Extension 0. Elbow Flexion 140.  Strength:  Wrist Extensors 4+/5. Wrist Flexors 5/5. Pronation 5/5. Supination 5/5.  Stability:  No objective elbow instability.  Stable varus and valgus stress.  Tests:  No pertinent positive or negative tests.  Neurovascular:  Sensation intact in Ax/R/M/U nerve distributions. 2+ radial pulse.        Studies Reviewed  XR of left elbow - Images from 1/20/25 demonstrate no acute fractures or dislocation. No lytic or blastic osseous lesions. No signs of degenerative changes.      Procedures  No procedures today.    Medical, Surgical, Family, and Social History  The patient's medical history, family history, and social history, were reviewed and updated as appropriate.    History reviewed. No pertinent past medical history.    Past Surgical History:   Procedure Laterality Date    CHEST WALL BIOPSY Right 10/27/2016    Procedure: ABDOMINAL MASS EXCISION ;  Surgeon: Darlin Reyna MD;  Location: [facility];  Service:     CHOLECYSTECTOMY      2011    [place] BIOPSY SOFT TISSUE BACK/FLANK SUPERFICIAL Left 10/27/2016    Procedure: SHOULDER MASS EXCISION ;  Surgeon: Darlin Reyna MD;  Location: [facility];  Service: Plastics       Family " History   Problem Relation Age of Onset    Hypertension Mother     Colon polyps Mother     Hypertension Father     Diabetes Father     Prostate cancer Father 58        2nd occurence 63    Leukemia Father     No Known Problems Sister     No Known Problems Son     Crohn's disease Son     No Known Problems Maternal Grandmother     Prostate cancer Maternal Grandfather     Lymphoma Maternal Grandfather 80    Cancer Paternal Grandmother     Parkinsonism Paternal Grandmother     Hypertension Paternal Grandmother     No Known Problems Paternal Grandfather     Ovarian cancer Maternal Aunt     Breast cancer Maternal Aunt     Breast cancer Maternal Aunt     No Known Problems Maternal Uncle     No Known Problems Paternal Aunt     Endometrial cancer Other 80    Breast cancer Other 80        age unknown    Ovarian cancer Other 80    Hypertension Other     Kidney disease Other        Social History     Occupational History    Not on file   Tobacco Use    Smoking status: Never    Smokeless tobacco: Never   Vaping Use    Vaping status: Never Used   Substance and Sexual Activity    Alcohol use: Yes     Alcohol/week: 1.0 standard drink of alcohol     Types: 1 Glasses of wine per week     Comment: occasionally 2 times per week    Drug use: No    Sexual activity: Yes     Partners: Male     Birth control/protection: Male Sterilization       No Known Allergies      Current Outpatient Medications:     MULTIPLE VITAMINS PO, Take by mouth daily, Disp: , Rfl:     Rhubarb (ESTROVEN COMPLETE PO), , Disp: , Rfl:     St Johns Wort 300 MG CAPS, , Disp: , Rfl:     Black Cohosh 540 MG CAPS, , Disp: , Rfl:     methylPREDNISolone 4 MG tablet therapy pack, Use as directed on package, Disp: 1 each, Rfl: 0      Clif Mcneal    Scribe Attestation      I,:  Clif Mcneal am acting as a scribe while in the presence of the attending physician.:       I,:  Robert Alejandro MD personally performed the services described in this documentation    as  scribed in my presence.:

## 2025-02-11 ENCOUNTER — APPOINTMENT (OUTPATIENT)
Age: 50
End: 2025-02-11
Payer: COMMERCIAL

## 2025-02-11 ENCOUNTER — OFFICE VISIT (OUTPATIENT)
Age: 50
End: 2025-02-11
Payer: COMMERCIAL

## 2025-02-11 VITALS
BODY MASS INDEX: 27.31 KG/M2 | OXYGEN SATURATION: 98 % | TEMPERATURE: 99.7 F | HEART RATE: 80 BPM | DIASTOLIC BLOOD PRESSURE: 60 MMHG | RESPIRATION RATE: 20 BRPM | WEIGHT: 160 LBS | HEIGHT: 64 IN | SYSTOLIC BLOOD PRESSURE: 122 MMHG

## 2025-02-11 DIAGNOSIS — R07.9 CHEST PAIN, UNSPECIFIED TYPE: ICD-10-CM

## 2025-02-11 DIAGNOSIS — J18.9 PNEUMONIA OF LEFT UPPER LOBE DUE TO INFECTIOUS ORGANISM: Primary | ICD-10-CM

## 2025-02-11 LAB
ATRIAL RATE: 80 BPM
P AXIS: 24 DEGREES
PR INTERVAL: 144 MS
QRS AXIS: -39 DEGREES
QRSD INTERVAL: 90 MS
QT INTERVAL: 390 MS
QTC INTERVAL: 450 MS
T WAVE AXIS: 42 DEGREES
VENTRICULAR RATE: 80 BPM

## 2025-02-11 PROCEDURE — 93010 ELECTROCARDIOGRAM REPORT: CPT

## 2025-02-11 PROCEDURE — 99213 OFFICE O/P EST LOW 20 MIN: CPT

## 2025-02-11 PROCEDURE — 93005 ELECTROCARDIOGRAM TRACING: CPT

## 2025-02-11 PROCEDURE — 71046 X-RAY EXAM CHEST 2 VIEWS: CPT

## 2025-02-11 RX ORDER — PREDNISONE 20 MG/1
40 TABLET ORAL DAILY
Qty: 10 TABLET | Refills: 0 | Status: SHIPPED | OUTPATIENT
Start: 2025-02-11 | End: 2025-02-16

## 2025-02-11 RX ORDER — AZITHROMYCIN 250 MG/1
TABLET, FILM COATED ORAL
Qty: 6 TABLET | Refills: 0 | Status: SHIPPED | OUTPATIENT
Start: 2025-02-11 | End: 2025-02-15

## 2025-02-11 NOTE — PATIENT INSTRUCTIONS
EKG - normal sinus rhythm    Preliminary reading of Chest Xray: Area of haziness that is concerning for Pneumonia in the left middle area- will treat with antibiotics     Take antibiotics and steroids as prescribed  For decongestion, Over The Counter medications:  Nasal corticosteroid: examples are Flonase or Nasacort.  Nasal saline irrigation  Humidified air  Warm moist air such as a hot cup of water in a mug, sit at the dining room table with the mug on the table, put a towel over your head to cover over the mug and breath in the warm steam (don't drink the fluid in case you have mucus that drips in).  Vicks Vapor Rub  Over the counter Mucinex and increase you fluid intake.  For Cough or sore throat:  Salt water gurgle  Teaspoon of Honey up to 3x/day  Chloraseptic spray  Throat lozenges  Over the Counter Tylenol or Ibuprofen  Dextromethorphan 30mg PO every 6-8 hours for cough. max 120 mg in 24 hour period.    Follow up with Primary Care Provider in 3-5 days if not improving.  Proceed to Emergency Department if symptoms worsen.    If tests have been performed at [facility], our office will contact you with results if changes need to be made to the care plan discussed with you at the visit.  You can review your full results on [facility] MyChart.

## 2025-02-11 NOTE — PROGRESS NOTES
[facility]        NAME: Belen Lawton is a 49 y.o. female  : 1975    MRN: [id number]  DATE: 2025  TIME: 3:58 PM    Assessment and Plan   Pneumonia of left upper lobe due to infectious organism [J18.9]  1. Pneumonia of left upper lobe due to infectious organism  azithromycin (ZITHROMAX) 250 mg tablet    predniSONE 20 mg tablet      2. Chest pain, unspecified type  ECG 12 lead    XR chest pa and lateral            Patient Instructions   EKG - normal sinus rhythm    Preliminary reading of Chest Xray: Area of haziness that is concerning for Pneumonia in the left middle area- will treat with antibiotics     Take antibiotics and steroids as prescribed  For decongestion, Over The Counter medications:  Nasal corticosteroid: examples are Flonase or Nasacort.  Nasal saline irrigation  Humidified air  Warm moist air such as a hot cup of water in a mug, sit at the dining room table with the mug on the table, put a towel over your head to cover over the mug and breath in the warm steam (don't drink the fluid in case you have mucus that drips in).  Vicks Vapor Rub  Over the counter Mucinex and increase you fluid intake.  For Cough or sore throat:  Salt water gurgle  Teaspoon of Honey up to 3x/day  Chloraseptic spray  Throat lozenges  Over the Counter Tylenol or Ibuprofen  Dextromethorphan 30mg PO every 6-8 hours for cough. max 120 mg in 24 hour period.    Follow up with Primary Care Provider in 3-5 days if not improving.  Proceed to Emergency Department if symptoms worsen.    If tests have been performed at [facility], our office will contact you with results if changes need to be made to the care plan discussed with you at the visit.  You can review your full results on [facility] MyChart.    Chief Complaint     Chief Complaint   Patient presents with   • Fever     C/o fevers, cough, chest tightness, pain when breathing, headaches, sore throat and lightheaded x 4-5 days. Pt. Using OTC decongestant  medication and cold medication.          History of Present Illness       Fevers, cough, chest tightness, pain with breathing, headaches, sore throat, and lightheaded starting 5 days ago.  Has been using over-the-counter decongestion medication and cold medications without improvement.  States that every day her symptoms feels like it is getting worse.    Fever  Associated symptoms include chest pain, congestion, coughing, a fever, headaches and a sore throat.   Fever - 9 weeks to 74 years   This is a new problem. The current episode started in the past 7 days. The problem occurs 2 to 4 times per day. The problem has been unchanged. The maximum temperature noted was 101 to 101.9 F. The temperature was taken using a tympanic thermometer. Associated symptoms include chest pain, congestion, coughing, diarrhea, headaches and a sore throat.   Risk factors: no contaminated food, no contaminated water, no hx of cancer, no immunosuppression, no occupational exposure, no recent sickness, no recent travel and no sick contacts        Review of Systems   Review of Systems   Constitutional:  Positive for fever.   HENT:  Positive for congestion and sore throat.    Respiratory:  Positive for cough.    Cardiovascular:  Positive for chest pain.   Gastrointestinal:  Positive for diarrhea.   Neurological:  Positive for light-headedness and headaches.         Current Medications       Current Outpatient Medications:   •  azithromycin (ZITHROMAX) 250 mg tablet, Take 2 tablets today then 1 tablet daily x 4 days, Disp: 6 tablet, Rfl: 0  •  MULTIPLE VITAMINS PO, Take by mouth daily, Disp: , Rfl:   •  predniSONE 20 mg tablet, Take 2 tablets (40 mg total) by mouth daily for 5 days, Disp: 10 tablet, Rfl: 0  •  Rhubarb (ESTROVEN COMPLETE PO), , Disp: , Rfl:   •  St Johns Wort 300 MG CAPS, , Disp: , Rfl:   •  Black Cohosh 540 MG CAPS, , Disp: , Rfl:     Current Allergies     Allergies as of 02/11/2025   • (No Known Allergies)            The  "following portions of the patient's history were reviewed and updated as appropriate: allergies, current medications, past family history, past medical history, past social history, past surgical history and problem list.     History reviewed. No pertinent past medical history.    Past Surgical History:   Procedure Laterality Date   • CHEST WALL BIOPSY Right 10/27/2016    Procedure: ABDOMINAL MASS EXCISION ;  Surgeon: Darlin Reyna MD;  Location[facility];  Service:    • CHOLECYSTECTOMY      2011   • NC BIOPSY SOFT TISSUE BACK/FLANK SUPERFICIAL Left 10/27/2016    Procedure: SHOULDER MASS EXCISION ;  Surgeon: Darlin Reyna MD;  Location: [facility];  Service: Plastics       Family History   Problem Relation Age of Onset   • Hypertension Mother    • Colon polyps Mother    • Hypertension Father    • Diabetes Father    • Prostate cancer Father 58        2nd occurence 63   • Leukemia Father    • No Known Problems Sister    • No Known Problems Son    • Crohn's disease Son    • No Known Problems Maternal Grandmother    • Prostate cancer Maternal Grandfather    • Lymphoma Maternal Grandfather 80   • Cancer Paternal Grandmother    • Parkinsonism Paternal Grandmother    • Hypertension Paternal Grandmother    • No Known Problems Paternal Grandfather    • Ovarian cancer Maternal Aunt    • Breast cancer Maternal Aunt    • Breast cancer Maternal Aunt    • No Known Problems Maternal Uncle    • No Known Problems Paternal Aunt    • Endometrial cancer Other 80   • Breast cancer Other 80        age unknown   • Ovarian cancer Other 80   • Hypertension Other    • Kidney disease Other          Medications have been verified.        Objective   /60 (BP Location: Left arm, Patient Position: Sitting)   Pulse 80   Temp 99.7 °F (37.6 °C)   Resp 20   Ht 5' 4\" (1.626 m)   Wt 72.6 kg (160 lb)   LMP 11/19/2021   SpO2 98%   BMI 27.46 kg/m²   Patient's last menstrual period was 11/19/2021.      Physical Exam     Physical Exam  Vitals and " nursing note reviewed.   Constitutional:       Appearance: Normal appearance.   HENT:      Head: Normocephalic and atraumatic.      Right Ear: Tympanic membrane normal.      Left Ear: Tympanic membrane normal.      Nose: Congestion and rhinorrhea present.      Mouth/Throat:      Mouth: Mucous membranes are moist.      Pharynx: No oropharyngeal exudate or posterior oropharyngeal erythema.   Eyes:      Pupils: Pupils are equal, round, and reactive to light.   Cardiovascular:      Rate and Rhythm: Normal rate.      Pulses: Normal pulses.   Pulmonary:      Effort: Pulmonary effort is normal.      Breath sounds: Normal breath sounds. No wheezing, rhonchi or rales.   Skin:     General: Skin is warm and dry.      Capillary Refill: Capillary refill takes less than 2 seconds.   Neurological:      General: No focal deficit present.      Mental Status: She is alert and oriented to person, place, and time. Mental status is at baseline.      Sensory: No sensory deficit.      Motor: No weakness.   Psychiatric:         Mood and Affect: Mood normal.         Behavior: Behavior normal.         Thought Content: Thought content normal.

## 2025-04-29 ENCOUNTER — TELEPHONE (OUTPATIENT)
Dept: FAMILY MEDICINE CLINIC | Facility: CLINIC | Age: 50
End: 2025-04-29

## 2025-05-08 ENCOUNTER — APPOINTMENT (OUTPATIENT)
Dept: LAB | Facility: CLINIC | Age: 50
End: 2025-05-08
Payer: COMMERCIAL

## 2025-05-08 ENCOUNTER — RESULTS FOLLOW-UP (OUTPATIENT)
Dept: FAMILY MEDICINE CLINIC | Facility: CLINIC | Age: 50
End: 2025-05-08

## 2025-05-08 ENCOUNTER — OFFICE VISIT (OUTPATIENT)
Dept: FAMILY MEDICINE CLINIC | Facility: CLINIC | Age: 50
End: 2025-05-08
Payer: COMMERCIAL

## 2025-05-08 VITALS
HEIGHT: 65 IN | OXYGEN SATURATION: 99 % | TEMPERATURE: 97.6 F | SYSTOLIC BLOOD PRESSURE: 122 MMHG | WEIGHT: 157 LBS | DIASTOLIC BLOOD PRESSURE: 76 MMHG | BODY MASS INDEX: 26.16 KG/M2 | HEART RATE: 88 BPM

## 2025-05-08 DIAGNOSIS — R92.30 DENSE BREASTS: ICD-10-CM

## 2025-05-08 DIAGNOSIS — Z13.220 SCREENING, LIPID: ICD-10-CM

## 2025-05-08 DIAGNOSIS — Z00.00 ANNUAL PHYSICAL EXAM: ICD-10-CM

## 2025-05-08 DIAGNOSIS — Z13.1 SCREENING FOR DIABETES MELLITUS: ICD-10-CM

## 2025-05-08 DIAGNOSIS — E66.3 OVERWEIGHT WITH BODY MASS INDEX (BMI) OF 26 TO 26.9 IN ADULT: ICD-10-CM

## 2025-05-08 DIAGNOSIS — Z12.11 SCREENING FOR COLORECTAL CANCER: ICD-10-CM

## 2025-05-08 DIAGNOSIS — Z12.12 SCREENING FOR COLORECTAL CANCER: ICD-10-CM

## 2025-05-08 DIAGNOSIS — Z83.719 FAMILY HISTORY OF COLONIC POLYPS: ICD-10-CM

## 2025-05-08 DIAGNOSIS — N95.1 MENOPAUSAL SYMPTOMS: ICD-10-CM

## 2025-05-08 DIAGNOSIS — Z00.00 ANNUAL PHYSICAL EXAM: Primary | ICD-10-CM

## 2025-05-08 DIAGNOSIS — Z12.31 ENCOUNTER FOR SCREENING MAMMOGRAM FOR BREAST CANCER: ICD-10-CM

## 2025-05-08 LAB
ALBUMIN SERPL BCG-MCNC: 4.3 G/DL (ref 3.5–5)
ALP SERPL-CCNC: 84 U/L (ref 34–104)
ALT SERPL W P-5'-P-CCNC: 44 U/L (ref 7–52)
ANION GAP SERPL CALCULATED.3IONS-SCNC: 11 MMOL/L (ref 4–13)
AST SERPL W P-5'-P-CCNC: 37 U/L (ref 13–39)
BILIRUB SERPL-MCNC: 0.51 MG/DL (ref 0.2–1)
BUN SERPL-MCNC: 10 MG/DL (ref 5–25)
CALCIUM SERPL-MCNC: 9.5 MG/DL (ref 8.4–10.2)
CHLORIDE SERPL-SCNC: 101 MMOL/L (ref 96–108)
CHOLEST SERPL-MCNC: 153 MG/DL (ref ?–200)
CO2 SERPL-SCNC: 26 MMOL/L (ref 21–32)
CREAT SERPL-MCNC: 0.62 MG/DL (ref 0.6–1.3)
EST. AVERAGE GLUCOSE BLD GHB EST-MCNC: 85 MG/DL
GFR SERPL CREATININE-BSD FRML MDRD: 106 ML/MIN/1.73SQ M
GLUCOSE P FAST SERPL-MCNC: 80 MG/DL (ref 65–99)
HBA1C MFR BLD: 4.6 %
HDLC SERPL-MCNC: 79 MG/DL
LDLC SERPL CALC-MCNC: 65 MG/DL (ref 0–100)
NONHDLC SERPL-MCNC: 74 MG/DL
POTASSIUM SERPL-SCNC: 4 MMOL/L (ref 3.5–5.3)
PROT SERPL-MCNC: 7 G/DL (ref 6.4–8.4)
SODIUM SERPL-SCNC: 138 MMOL/L (ref 135–147)
TRIGL SERPL-MCNC: 45 MG/DL (ref ?–150)

## 2025-05-08 PROCEDURE — 80061 LIPID PANEL: CPT

## 2025-05-08 PROCEDURE — 83036 HEMOGLOBIN GLYCOSYLATED A1C: CPT

## 2025-05-08 PROCEDURE — 99396 PREV VISIT EST AGE 40-64: CPT | Performed by: FAMILY MEDICINE

## 2025-05-08 PROCEDURE — 36415 COLL VENOUS BLD VENIPUNCTURE: CPT

## 2025-05-08 PROCEDURE — 80053 COMPREHEN METABOLIC PANEL: CPT

## 2025-05-08 NOTE — ASSESSMENT & PLAN NOTE
Discussed seeing GYN and consideration of SSRI , hormone therapy or bio identical hormones Patient reminded if she does the SSRi she needs to stop the St johns wort

## 2025-05-08 NOTE — ASSESSMENT & PLAN NOTE
Patient needs to do the advanced directives patient due for mammogram and also needs cologuard this fall Discussed diet exercise Patient to also wear sunscreen Patient is up to date with the safety issues also Follwoup in 1 yr  Orders:    Comprehensive metabolic panel; Future    Hemoglobin A1C; Future    Lipid panel; Future    Mammo screening bilateral w 3d and cad; Future    Cologuard; Future

## 2025-05-08 NOTE — TELEPHONE ENCOUNTER
----- Message from Shirley Powell DO sent at 5/8/2025  2:43 PM EDT -----  Call patient labs are stable continue current care and follwoup as scheduled

## 2025-05-08 NOTE — ASSESSMENT & PLAN NOTE
BMI Counseling: Body mass index is 26.16 kg/m². The BMI is above normal. Nutrition recommendations include reducing portion sizes, decreasing overall calorie intake, 3-5 servings of fruits/vegetables daily, and moderation in carbohydrate intake. Exercise recommendations include exercising 3-5 times per week.

## 2025-05-08 NOTE — PATIENT INSTRUCTIONS
"Patient Education     Routine physical for adults   The Basics   Written by the doctors and editors at [facility]   What is a physical? -- A physical is a routine visit, or \"check-up,\" with your doctor. You might also hear it called a \"wellness visit\" or \"preventive visit.\"  During each visit, the doctor will:   Ask about your physical and mental health   Ask about your habits, behaviors, and lifestyle   Do an exam   Give you vaccines if needed   Talk to you about any medicines you take   Give advice about your health   Answer your questions  Getting regular check-ups is an important part of taking care of your health. It can help your doctor find and treat any problems you have. But it's also important for preventing health problems.  A routine physical is different from a \"sick visit.\" A sick visit is when you see a doctor because of a health concern or problem. Since physicals are scheduled ahead of time, you can think about what you want to ask the doctor.  How often should I get a physical? -- It depends on your age and health. In general, for people age 21 years and older:   If you are younger than 50 years, you might be able to get a physical every 3 years.   If you are 50 years or older, your doctor might recommend a physical every year.  If you have an ongoing health condition, like diabetes or high blood pressure, your doctor will probably want to see you more often.  What happens during a physical? -- In general, each visit will include:   Physical exam - The doctor or nurse will check your height, weight, heart rate, and blood pressure. They will also look at your eyes and ears. They will ask about how you are feeling and whether you have any symptoms that bother you.   Medicines - It's a good idea to bring a list of all the medicines you take to each doctor visit. Your doctor will talk to you about your medicines and answer any questions. Tell them if you are having any side effects that bother you. You " "should also tell them if you are having trouble paying for any of your medicines.   Habits and behaviors - This includes:   Your diet   Your exercise habits   Whether you smoke, drink alcohol, or use drugs   Whether you are sexually active   Whether you feel safe at home  Your doctor will talk to you about things you can do to improve your health and lower your risk of health problems. They will also offer help and support. For example, if you want to quit smoking, they can give you advice and might prescribe medicines. If you want to improve your diet or get more physical activity, they can help you with this, too.   Lab tests, if needed - The tests you get will depend on your age and situation. For example, your doctor might want to check your:   Cholesterol   Blood sugar   Iron level   Vaccines - The recommended vaccines will depend on your age, health, and what vaccines you already had. Vaccines are very important because they can prevent certain serious or deadly infections.   Discussion of screening - \"Screening\" means checking for diseases or other health problems before they cause symptoms. Your doctor can recommend screening based on your age, risk, and preferences. This might include tests to check for:   Cancer, such as breast, prostate, cervical, ovarian, colorectal, prostate, lung, or skin cancer   Sexually transmitted infections, such as chlamydia and gonorrhea   Mental health conditions like depression and anxiety  Your doctor will talk to you about the different types of screening tests. They can help you decide which screenings to have. They can also explain what the results might mean.   Answering questions - The physical is a good time to ask the doctor or nurse questions about your health. If needed, they can refer you to other doctors or specialists, too.  Adults older than 65 years often need other care, too. As you get older, your doctor will talk to you about:   How to prevent falling at " home   Hearing or vision tests   Memory testing   How to take your medicines safely   Making sure that you have the help and support you need at home  All topics are updated as new evidence becomes available and our peer review process is complete.  This topic retrieved from [employer] on: May 02, 2024.  Topic 577830 Version 1.0  Release: 32.4.3 - C32.122  © 2024 UpToDate, Inc. and/or its affiliates. All rights reserved.  Consumer Information Use and Disclaimer   Disclaimer: This generalized information is a limited summary of diagnosis, treatment, and/or medication information. It is not meant to be comprehensive and should be used as a tool to help the user understand and/or assess potential diagnostic and treatment options. It does NOT include all information about conditions, treatments, medications, side effects, or risks that may apply to a specific patient. It is not intended to be medical advice or a substitute for the medical advice, diagnosis, or treatment of a health care provider based on the health care provider's examination and assessment of a patient's specific and unique circumstances. Patients must speak with a health care provider for complete information about their health, medical questions, and treatment options, including any risks or benefits regarding use of medications. This information does not endorse any treatments or medications as safe, effective, or approved for treating a specific patient. UpToDate, Inc. and its affiliates disclaim any warranty or liability relating to this information or the use thereof.The use of this information is governed by the Terms of Use, available at https://www.woltersSpringestuwer.com/en/know/clinical-effectiveness-terms. 2024© UpToDate, Inc. and its affiliates and/or licensors. All rights reserved.  Copyright   © 2024 UpToDate, Inc. and/or its affiliates. All rights reserved.

## 2025-05-08 NOTE — PROGRESS NOTES
Adult Annual Physical  Name: Belen Lawton      : 1975      MRN: [id number]  Encounter Provider: Shirley Powell DO  Encounter Date: 2025   Encounter department: [facility]    :  Assessment & Plan  Annual physical exam  Patient needs to do the advanced directives patient due for mammogram and also needs cologuard this fall Discussed diet exercise Patient to also wear sunscreen Patient is up to date with the safety issues also Follwoup in 1 yr  Orders:    Comprehensive metabolic panel; Future    Hemoglobin A1C; Future    Lipid panel; Future    Mammo screening bilateral w 3d and cad; Future    Cologuard; Future    Dense breasts  Check mammogram       Family history of colonic polyps  Cologuard ordered       Overweight with body mass index (BMI) of 26 to 26.9 in adult  BMI Counseling: Body mass index is 26.16 kg/m². The BMI is above normal. Nutrition recommendations include reducing portion sizes, decreasing overall calorie intake, 3-5 servings of fruits/vegetables daily, and moderation in carbohydrate intake. Exercise recommendations include exercising 3-5 times per week.         Menopausal symptoms  Discussed seeing GYN and consideration of SSRI , hormone therapy or bio identical hormones Patient reminded if she does the SSRi she needs to stop the [facility]       Screening for diabetes mellitus    Orders:    Comprehensive metabolic panel; Future    Hemoglobin A1C; Future    Screening, lipid    Orders:    Lipid panel; Future    Encounter for screening mammogram for breast cancer    Orders:    Mammo screening bilateral w 3d and cad; Future    Screening for colorectal cancer    Orders:    Cologuard; Future        Preventive Screenings:  - Diabetes Screening: screening up-to-date and risks/benefits discussed  - Cholesterol Screening: risks/benefits discussed   - Hepatitis C screening: screening up-to-date   - HIV screening: screening up-to-date   - Cervical cancer screening:  Population Health Chart Review & Patient Outreach Details      Additional Pop Health Notes:               Updates Requested / Reviewed:      Updated Care Coordination Note         Health Maintenance Topics Overdue:      VBHM Score: 0     Patient is not due for any topics at this time.    Influenza Vaccine                  Health Maintenance Topic(s) Outreach Outcomes & Actions Taken:    Colorectal Cancer Screening - Outreach Outcomes & Actions Taken  : External Records Requested & Care Team Updated if Applicable   screening up-to-date   - Breast cancer screening: screening up-to-date, orders placed and risks/benefits discussed   - Colon cancer screening: screening up-to-date   - Lung cancer screening: screening not indicated     Counseling/Anticipatory Guidance:    - Dental health: discussed importance of regular tooth brushing, flossing, and dental visits.   - Sexual health: discussed sexually transmitted diseases, partner selection, use of condoms, avoidance of unintended pregnancy, and contraceptive alternatives.   - Diet: discussed recommendations for a healthy/well-balanced diet.   - Exercise: the importance of regular exercise/physical activity was discussed. Recommend exercise 3-5 times per week for at least 30 minutes.   - Injury prevention: discussed safety/seat belts, safety helmets, smoke detectors, carbon monoxide detectors, and smoking near bedding or upholstery.          History of Present Illness     Adult Annual Physical:  Patient presents for annual physical.     Diet and Physical Activity:  - Diet/Nutrition: well balanced diet and limited junk food.  - Exercise: walking, 5-7 times a week on average and 30-60 minutes on average.    Depression Screening:  - PHQ-2 Score: 0    General Health:  - Sleep: sleeps poorly, unrefreshing sleep and 4-6 hours of sleep on average. has menopausal issues with sleep and hot flashes  - Hearing: normal hearing right ear and normal hearing left ear.  - Vision: no vision problems.  - Dental: regular dental visits and brushes teeth twice daily.    /GYN Health:  - Follows with GYN: yes.   - Menopause: postmenopausal.   - History of STDs: no  - Contraception: menopause.      Advanced Care Planning:  - Has an advanced directive?: no    - Has a durable medical POA?: no    - ACP document given to patient?: yes      Review of Systems   Constitutional:  Negative for fatigue, fever and unexpected weight change.   HENT:  Negative for congestion, sinus pain and trouble swallowing.     Eyes:  Negative for discharge and visual disturbance.   Respiratory:  Negative for cough, chest tightness, shortness of breath and wheezing.    Cardiovascular:  Negative for chest pain, palpitations and leg swelling.   Gastrointestinal:  Negative for abdominal pain, blood in stool, constipation, diarrhea, nausea and vomiting.   Genitourinary:  Negative for difficulty urinating, dysuria, frequency and hematuria.   Musculoskeletal:  Negative for arthralgias, gait problem and joint swelling.   Skin:  Negative for rash and wound.   Allergic/Immunologic: Negative for environmental allergies and food allergies.   Neurological:  Negative for dizziness, syncope, weakness, numbness and headaches.   Hematological:  Negative for adenopathy. Does not bruise/bleed easily.   Psychiatric/Behavioral:  Negative for confusion, decreased concentration and sleep disturbance. The patient is not nervous/anxious.          Objective   LMP 11/19/2021     Physical Exam  Vitals and nursing note reviewed.   Constitutional:       Appearance: Normal appearance. She is well-developed.   HENT:      Head: Normocephalic and atraumatic.      Right Ear: Hearing, tympanic membrane and external ear normal.      Left Ear: Hearing, tympanic membrane and external ear normal.   Eyes:      Extraocular Movements: Extraocular movements intact.      Conjunctiva/sclera: Conjunctivae normal.      Pupils: Pupils are equal, round, and reactive to light.   Neck:      Thyroid: No thyromegaly.   Cardiovascular:      Rate and Rhythm: Normal rate and regular rhythm.      Heart sounds: Normal heart sounds.   Pulmonary:      Effort: Pulmonary effort is normal.      Breath sounds: Normal breath sounds. No wheezing or rales.   Abdominal:      General: Bowel sounds are normal. There is no distension.      Palpations: Abdomen is soft.      Tenderness: There is no abdominal tenderness.   Musculoskeletal:         General: No tenderness.      Cervical back: Neck supple.    Lymphadenopathy:      Cervical: No cervical adenopathy.   Skin:     General: Skin is warm and dry.      Findings: No rash.   Neurological:      General: No focal deficit present.      Mental Status: She is alert and oriented to person, place, and time.      Cranial Nerves: No cranial nerve deficit.      Coordination: Coordination normal.   Psychiatric:         Mood and Affect: Mood normal.         Behavior: Behavior normal.         Thought Content: Thought content normal.         Judgment: Judgment normal.

## 2025-05-13 ENCOUNTER — ANNUAL EXAM (OUTPATIENT)
Dept: OBGYN CLINIC | Facility: CLINIC | Age: 50
End: 2025-05-13

## 2025-05-13 VITALS
HEART RATE: 87 BPM | OXYGEN SATURATION: 98 % | WEIGHT: 152.6 LBS | HEIGHT: 65 IN | SYSTOLIC BLOOD PRESSURE: 110 MMHG | BODY MASS INDEX: 25.43 KG/M2 | DIASTOLIC BLOOD PRESSURE: 62 MMHG

## 2025-05-13 DIAGNOSIS — Z80.41 FAMILY HISTORY OF OVARIAN CANCER: ICD-10-CM

## 2025-05-13 DIAGNOSIS — Z12.4 SCREENING FOR CERVICAL CANCER: ICD-10-CM

## 2025-05-13 DIAGNOSIS — Z01.419 WOMEN'S ANNUAL ROUTINE GYNECOLOGICAL EXAMINATION: Primary | ICD-10-CM

## 2025-05-13 DIAGNOSIS — N95.1 MENOPAUSAL SYMPTOMS: ICD-10-CM

## 2025-05-13 DIAGNOSIS — N95.1 HOT FLASHES, MENOPAUSAL: ICD-10-CM

## 2025-05-13 PROCEDURE — G0476 HPV COMBO ASSAY CA SCREEN: HCPCS | Performed by: OBSTETRICS & GYNECOLOGY

## 2025-05-13 PROCEDURE — G0145 SCR C/V CYTO,THINLAYER,RESCR: HCPCS | Performed by: OBSTETRICS & GYNECOLOGY

## 2025-05-13 RX ORDER — ESTRADIOL 0.5 MG/1
0.5 TABLET ORAL DAILY
Qty: 90 TABLET | Refills: 1 | Status: SHIPPED | OUTPATIENT
Start: 2025-05-13

## 2025-05-13 RX ORDER — PROGESTERONE 100 MG/1
1 CAPSULE ORAL
Qty: 90 CAPSULE | Refills: 1 | Status: SHIPPED | OUTPATIENT
Start: 2025-05-13

## 2025-05-13 NOTE — ASSESSMENT & PLAN NOTE
- Discussed ACOG guidelines for pap smear screening frequency: performed today  - Discussed healthy lifestyle recommendations for diet, exercise and self breast awareness.  - Discussed ACOG recommendations for screening mammograms: due, scheduled   - Discussed age based recommendations for adequate calcium and vitamin D intake. No additional osteoporosis screening indicated at this time.  - Discussed ACOG recommendations for colon cancer screening: due this fall   - Safe sex practices were discussed and STI testing was not desired by the patient  - Contraceptive options were reviewed: n/a postmenopausal   - Routine follow up in 1 year was recommended or sooner as needed. All questions and concerns were addressed.

## 2025-05-13 NOTE — ASSESSMENT & PLAN NOTE
Vasomotor symptoms occur up to 80% of women an most women can describe them as severe. Most symptoms occur in the transition period but can also happen in late and postmenopausal period. Mean duration is about 5 years but symptoms can last even up to 10 years after the final menstrual period. General behavioral measures include lowering room temperature, layering of clothes, and removal of triggers such as spicy foods or stress. Women with moderate to severe symptoms may need medication in the form or hormonal therapy or non-hormonal therapy.    Discussed with patient menopausal hormone therapy (also known as HRT) weighing risks versus benefits. Risks associated with hormone replacement may include stroke, VTE, cardiovascular disease, breast cancer, endometrial hyperplasia and carcinoma. Discussed with the primary goal is to relieve hot flashes. The other symptoms associated with menopausal symptoms include sleep disturbances, mood disorders, and in some instances joint aches and pains.  Discussed that women being treated for menopausal symptoms such as hot flashes require systemic/oral estrogen while women with vaginal atrophy should be treated with topical/vaginal estrogen. With regards to route, available forms are oral tablets or transdermal in the form of patches or gels/spray. Discussed at the lowest effective dose should be used. Lower dosages typically are associated with less vaginal bleeding and less breast tenderness as well as few or affects on coagulation and lower risk of stroke and VTE.    Reviewed that ideal timing for initiation of hormone therapy would be within 10 years of menopause and younger than age 60 years old.     All women with intact uterus needed progestin  to prevent endometrial hyperplasia.  Dosing for progesterone may be micronized progesterone 200 mg per day for 12 days per month as a cyclic regimen or 100 mg daily continuous regimen.     In early postmenopause or late menopausal  transition, it yearly we start with oral estradiol 0.5 mg daily or 0.025mg twice weekly patch  with moderate symptoms.  For those with more severe symptoms 1 mg daily of estradiol is given or transdermal 0.05 mg twice weekly. Low estrogen oral contraceptive as an option for mara-menopausal woman who seek relief of menopausal symptoms and also desire contraception, given that they are candidates to receive contraceptive doses of estrogen. Typically, if hot flashes or completely relieved and patient is tolerating hormone replacement, this regimen is continued for several years with a goal of no more than 5 years total.     Pt is not interested in transdermal regimen, prefers oral  Will f/u in 3 months

## 2025-05-13 NOTE — PROGRESS NOTES
Subjective      Belen Lawton is a 50 y.o. female who presents for annual exam.      Chief Complaint   Patient presents with    New Patient Visit     [facility] care. Transfer in from [facility]. Yearly. Would like to discuss menopause.      Last seen  at [facility]  Last menses   Hot flashes, sleep disruption getting worse in last few weeks   Vaginal dryness/irritation   Takes st johns wort for anxiety - helped   Has tried OTC estroven   Had previously discussed HRT   Has lost about 22 lbs since January - on semaglutide       Last Pap: 2025 NILM/HPV neg   Last mammogram: 2023 BIRADS1 - scheduled   Colorectal cancer screening: Cologuard  normal, due this fall - ordered by PCP   DEXA: n/a       History of abnormal Pap smear: yes - remote h/o abnml, denies excisional procedures  History of abnormal mammogram: no      Family history of uterine or ovarian cancer: yes - maternal aunt with ovarian cancer  Family history of breast cancer: yes -  Maternal great GM  Family history of colon cancer: no.       Menstrual History:  OB History          2    Para   2    Term   2       0    AB   0    Living   2         SAB   0    IAB   0    Ectopic   0    Multiple   0    Live Births   2                    Patient's last menstrual period was 2021.         Past Medical History:   Diagnosis Date    Anxiety      Past Surgical History:   Procedure Laterality Date    CHEST WALL BIOPSY Right 10/27/2016    Procedure: ABDOMINAL MASS EXCISION ;  Surgeon: Darlin Reyna MD;  Location: [facility];  Service:     CHOLECYSTECTOMY          IN BIOPSY SOFT TISSUE BACK/FLANK SUPERFICIAL Left 10/27/2016    Procedure: SHOULDER MASS EXCISION ;  Surgeon: Darlin Reyna MD;  Location: [facility];  Service: Plastics     Family History   Problem Relation Age of Onset    Hypertension Mother     Colon polyps Mother     Hypertension Father     Diabetes Father     Prostate cancer Father 58        2nd occurence 63    Leukemia  Father     No Known Problems Sister     No Known Problems Son     Crohn's disease Son     Breast cancer Maternal Grandmother     Hypertension Maternal Grandmother     Prostate cancer Maternal Grandfather     Lymphoma Maternal Grandfather 80    Cancer Paternal Grandmother     Parkinsonism Paternal Grandmother     Hypertension Paternal Grandmother     No Known Problems Paternal Grandfather     Ovarian cancer Maternal Aunt     Breast cancer Maternal Aunt     Breast cancer Maternal Aunt     No Known Problems Maternal Uncle     No Known Problems Paternal Aunt     Endometrial cancer Other 80    Breast cancer Other 80        age unknown    Ovarian cancer Other 80    Hypertension Other     Kidney disease Other     Ovarian cancer Sister        Social History     Tobacco Use    Smoking status: Never    Smokeless tobacco: Never   Vaping Use    Vaping status: Never Used   Substance Use Topics    Alcohol use: Yes     Alcohol/week: 3.0 standard drinks of alcohol     Types: 3 Glasses of wine per week     Comment: occasionally 2 times per week    Drug use: No          Current Outpatient Medications:     estradiol (ESTRACE) 0.5 MG tablet, Take 1 tablet (0.5 mg total) by mouth daily, Disp: 90 tablet, Rfl: 1    MULTIPLE VITAMINS PO, Take by mouth daily, Disp: , Rfl:     Progesterone 100 MG CAPS, Take 1 capsule by mouth daily at bedtime, Disp: 90 capsule, Rfl: 1    Rhubarb (ESTROVEN COMPLETE PO), , Disp: , Rfl:     SEMAGLUTIDE, 1 MG/DOSE, SC, , Disp: , Rfl:     St Johns Wort 300 MG CAPS, , Disp: , Rfl:     No Known Allergies        Review of Systems   Constitutional:  Negative for appetite change, chills and fever.   Eyes:  Negative for visual disturbance.   Respiratory:  Negative for cough, chest tightness and shortness of breath.    Cardiovascular:  Negative for chest pain.   Gastrointestinal:  Negative for abdominal distention, abdominal pain, constipation, diarrhea, nausea and vomiting.   Endocrine: Positive for heat intolerance.  "Negative for cold intolerance.   Genitourinary:  Negative for difficulty urinating, dyspareunia, dysuria, frequency, genital sores, pelvic pain, urgency, vaginal bleeding, vaginal discharge and vaginal pain.   Musculoskeletal:  Negative for arthralgias.   Neurological:  Negative for light-headedness and headaches.   Hematological:  Does not bruise/bleed easily.   Psychiatric/Behavioral:  Negative for behavioral problems.    All other systems reviewed and are negative.      /62 (BP Location: Right arm, Patient Position: Sitting, Cuff Size: Standard)   Pulse 87   Ht 5' 4.96\" (1.65 m)   Wt 69.2 kg (152 lb 9.6 oz)   LMP 11/19/2021   SpO2 98%   BMI 25.43 kg/m²         Physical Exam  Constitutional:       General: She is not in acute distress.     Appearance: Normal appearance.   Genitourinary:      Vulva, bladder and urethral meatus normal.      No lesions in the vagina.      Right Labia: No rash, tenderness, lesions or skin changes.     Left Labia: No tenderness, lesions, skin changes or rash.     No labial fusion noted.      No inguinal adenopathy present in the right or left side.     No vaginal discharge, erythema, tenderness, bleeding or ulceration.      No vaginal prolapse present.     Mild vaginal atrophy present.       Right Adnexa: not tender, not full and no mass present.     Left Adnexa: not tender, not full and no mass present.     No cervical motion tenderness, discharge, friability, lesion or polyp.      Uterus is not enlarged, fixed, tender or irregular.      No uterine mass detected.     Uterus is anteverted.      Pelvic exam was performed with patient in the lithotomy position.   Breasts:     Right: No swelling, bleeding, inverted nipple, mass, nipple discharge, skin change or tenderness.      Left: No swelling, bleeding, inverted nipple, mass, nipple discharge, skin change or tenderness.   HENT:      Head: Normocephalic and atraumatic.   Neck:      Thyroid: No thyromegaly.   Cardiovascular: "      Rate and Rhythm: Normal rate and regular rhythm.   Pulmonary:      Effort: Pulmonary effort is normal. No accessory muscle usage or respiratory distress.   Abdominal:      General: There is no distension.      Palpations: Abdomen is soft.      Tenderness: There is no abdominal tenderness. There is no guarding or rebound.   Musculoskeletal:         General: Normal range of motion.      Cervical back: Normal range of motion and neck supple.   Lymphadenopathy:      Upper Body:      Right upper body: No supraclavicular or axillary adenopathy.      Left upper body: No supraclavicular or axillary adenopathy.      Lower Body: No right inguinal and no right inguinal adenopathy. No left inguinal and no left inguinal adenopathy.   Neurological:      General: No focal deficit present.      Mental Status: She is alert.   Skin:     General: Skin is warm and dry.      Findings: No erythema.   Psychiatric:         Mood and Affect: Mood normal.         Behavior: Behavior normal.   Vitals and nursing note reviewed. Exam conducted with a chaperone present.               Menopausal symptoms  Vasomotor symptoms occur up to 80% of women an most women can describe them as severe. Most symptoms occur in the transition period but can also happen in late and postmenopausal period. Mean duration is about 5 years but symptoms can last even up to 10 years after the final menstrual period. General behavioral measures include lowering room temperature, layering of clothes, and removal of triggers such as spicy foods or stress. Women with moderate to severe symptoms may need medication in the form or hormonal therapy or non-hormonal therapy.    Discussed with patient menopausal hormone therapy (also known as HRT) weighing risks versus benefits. Risks associated with hormone replacement may include stroke, VTE, cardiovascular disease, breast cancer, endometrial hyperplasia and carcinoma. Discussed with the primary goal is to relieve hot  flashes. The other symptoms associated with menopausal symptoms include sleep disturbances, mood disorders, and in some instances joint aches and pains.  Discussed that women being treated for menopausal symptoms such as hot flashes require systemic/oral estrogen while women with vaginal atrophy should be treated with topical/vaginal estrogen. With regards to route, available forms are oral tablets or transdermal in the form of patches or gels/spray. Discussed at the lowest effective dose should be used. Lower dosages typically are associated with less vaginal bleeding and less breast tenderness as well as few or affects on coagulation and lower risk of stroke and VTE.    Reviewed that ideal timing for initiation of hormone therapy would be within 10 years of menopause and younger than age 60 years old.     All women with intact uterus needed progestin  to prevent endometrial hyperplasia.  Dosing for progesterone may be micronized progesterone 200 mg per day for 12 days per month as a cyclic regimen or 100 mg daily continuous regimen.     In early postmenopause or late menopausal transition, it yearly we start with oral estradiol 0.5 mg daily or 0.025mg twice weekly patch  with moderate symptoms.  For those with more severe symptoms 1 mg daily of estradiol is given or transdermal 0.05 mg twice weekly. Low estrogen oral contraceptive as an option for mara-menopausal woman who seek relief of menopausal symptoms and also desire contraception, given that they are candidates to receive contraceptive doses of estrogen. Typically, if hot flashes or completely relieved and patient is tolerating hormone replacement, this regimen is continued for several years with a goal of no more than 5 years total.     Pt is not interested in transdermal regimen, prefers oral  Will f/u in 3 months     Women's annual routine gynecological examination  - Discussed ACOG guidelines for pap smear screening frequency: performed today  -  Discussed healthy lifestyle recommendations for diet, exercise and self breast awareness.  - Discussed ACOG recommendations for screening mammograms: due, scheduled   - Discussed age based recommendations for adequate calcium and vitamin D intake. No additional osteoporosis screening indicated at this time.  - Discussed ACOG recommendations for colon cancer screening: due this fall   - Safe sex practices were discussed and STI testing was not desired by the patient  - Contraceptive options were reviewed: n/a postmenopausal   - Routine follow up in 1 year was recommended or sooner as needed. All questions and concerns were addressed.

## 2025-05-14 LAB
HPV HR 12 DNA CVX QL NAA+PROBE: NEGATIVE
HPV16 DNA CVX QL NAA+PROBE: NEGATIVE
HPV18 DNA CVX QL NAA+PROBE: NEGATIVE

## 2025-05-15 ENCOUNTER — HOSPITAL ENCOUNTER (OUTPATIENT)
Dept: MAMMOGRAPHY | Facility: MEDICAL CENTER | Age: 50
Discharge: HOME | End: 2025-05-15
Payer: COMMERCIAL

## 2025-05-15 VITALS — WEIGHT: 152 LBS | HEIGHT: 65 IN | BODY MASS INDEX: 25.33 KG/M2

## 2025-05-15 DIAGNOSIS — Z00.00 ANNUAL PHYSICAL EXAM: ICD-10-CM

## 2025-05-15 DIAGNOSIS — Z12.31 ENCOUNTER FOR SCREENING MAMMOGRAM FOR BREAST CANCER: ICD-10-CM

## 2025-05-15 PROCEDURE — 77067 SCR MAMMO BI INCL CAD: CPT

## 2025-05-15 PROCEDURE — 77063 BREAST TOMOSYNTHESIS BI: CPT

## 2025-05-19 ENCOUNTER — RESULTS FOLLOW-UP (OUTPATIENT)
Dept: OBGYN CLINIC | Facility: CLINIC | Age: 50
End: 2025-05-19

## 2025-05-19 ENCOUNTER — RESULTS FOLLOW-UP (OUTPATIENT)
Dept: FAMILY MEDICINE CLINIC | Facility: CLINIC | Age: 50
End: 2025-05-19

## 2025-05-19 DIAGNOSIS — R92.8 ABNORMAL MAMMOGRAM OF RIGHT BREAST: Primary | ICD-10-CM

## 2025-05-19 LAB
LAB AP GYN PRIMARY INTERPRETATION: NORMAL
Lab: NORMAL

## 2025-06-12 PROBLEM — Z01.419 WOMEN'S ANNUAL ROUTINE GYNECOLOGICAL EXAMINATION: Status: RESOLVED | Noted: 2025-05-13 | Resolved: 2025-06-12

## 2025-08-05 ENCOUNTER — HOSPITAL ENCOUNTER (OUTPATIENT)
Dept: ULTRASOUND IMAGING | Facility: CLINIC | Age: 50
Discharge: HOME | End: 2025-08-05
Attending: FAMILY MEDICINE
Payer: COMMERCIAL

## 2025-08-05 ENCOUNTER — HOSPITAL ENCOUNTER (OUTPATIENT)
Dept: MAMMOGRAPHY | Facility: CLINIC | Age: 50
Discharge: HOME | End: 2025-08-05
Attending: FAMILY MEDICINE
Payer: COMMERCIAL

## 2025-08-05 VITALS — BODY MASS INDEX: 25.33 KG/M2 | WEIGHT: 152 LBS | HEIGHT: 65 IN

## 2025-08-05 DIAGNOSIS — R92.8 ABNORMAL MAMMOGRAM: ICD-10-CM

## 2025-08-05 PROCEDURE — G0279 TOMOSYNTHESIS, MAMMO: HCPCS

## 2025-08-05 PROCEDURE — 76642 ULTRASOUND BREAST LIMITED: CPT

## 2025-08-05 PROCEDURE — 77065 DX MAMMO INCL CAD UNI: CPT

## 2025-08-13 ENCOUNTER — OFFICE VISIT (OUTPATIENT)
Dept: OBGYN CLINIC | Facility: CLINIC | Age: 50
End: 2025-08-13
Payer: COMMERCIAL